# Patient Record
Sex: FEMALE | Race: WHITE | NOT HISPANIC OR LATINO | Employment: OTHER | URBAN - METROPOLITAN AREA
[De-identification: names, ages, dates, MRNs, and addresses within clinical notes are randomized per-mention and may not be internally consistent; named-entity substitution may affect disease eponyms.]

---

## 2018-01-08 ENCOUNTER — OFFICE VISIT CONVERTED (OUTPATIENT)
Dept: FAMILY MEDICINE CLINIC | Facility: CLINIC | Age: 48
End: 2018-01-08
Attending: NURSE PRACTITIONER

## 2018-05-02 ENCOUNTER — OFFICE VISIT CONVERTED (OUTPATIENT)
Dept: SURGERY | Facility: CLINIC | Age: 48
End: 2018-05-02
Attending: NURSE PRACTITIONER

## 2018-07-12 ENCOUNTER — CONVERSION ENCOUNTER (OUTPATIENT)
Dept: MAMMOGRAPHY | Facility: HOSPITAL | Age: 48
End: 2018-07-12

## 2018-07-25 ENCOUNTER — CONVERSION ENCOUNTER (OUTPATIENT)
Dept: MAMMOGRAPHY | Facility: HOSPITAL | Age: 48
End: 2018-07-25

## 2019-02-05 ENCOUNTER — HOSPITAL ENCOUNTER (OUTPATIENT)
Dept: MAMMOGRAPHY | Facility: HOSPITAL | Age: 49
Discharge: HOME | End: 2019-02-05
Attending: OBSTETRICS & GYNECOLOGY

## 2019-02-20 ENCOUNTER — HOSPITAL ENCOUNTER (OUTPATIENT)
Dept: FAMILY MEDICINE CLINIC | Facility: CLINIC | Age: 49
Discharge: HOME | End: 2019-02-20
Attending: NURSE PRACTITIONER

## 2019-02-20 ENCOUNTER — CONVERSION ENCOUNTER (OUTPATIENT)
Dept: FAMILY MEDICINE CLINIC | Facility: CLINIC | Age: 49
End: 2019-02-20

## 2019-02-20 ENCOUNTER — OFFICE VISIT CONVERTED (OUTPATIENT)
Dept: FAMILY MEDICINE CLINIC | Facility: CLINIC | Age: 49
End: 2019-02-20
Attending: NURSE PRACTITIONER

## 2019-02-20 LAB
ALBUMIN SERPL-MCNC: 4 G/DL (ref 3.5–5)
ALBUMIN/GLOB SERPL: 1.2 {RATIO} (ref 1.4–2.6)
ALP SERPL-CCNC: 70 U/L (ref 42–98)
ALT SERPL-CCNC: 27 U/L (ref 10–40)
ANION GAP SERPL CALC-SCNC: 14 MMOL/L (ref 8–19)
APPEARANCE UR: CLEAR
AST SERPL-CCNC: 27 U/L (ref 15–50)
BASOPHILS # BLD AUTO: 0.03 10*3/UL (ref 0–0.2)
BASOPHILS NFR BLD AUTO: 0.5 % (ref 0–3)
BILIRUB SERPL-MCNC: 0.39 MG/DL (ref 0.2–1.3)
BILIRUB UR QL: NEGATIVE
BUN SERPL-MCNC: 12 MG/DL (ref 5–25)
BUN/CREAT SERPL: 19 {RATIO} (ref 6–20)
CALCIUM SERPL-MCNC: 8.9 MG/DL (ref 8.7–10.4)
CHLORIDE SERPL-SCNC: 104 MMOL/L (ref 99–111)
CHOLEST SERPL-MCNC: 171 MG/DL (ref 107–200)
CHOLEST/HDLC SERPL: 2.7 {RATIO} (ref 3–6)
COLOR UR: YELLOW
CONV ABS IMM GRAN: 0.01 10*3/UL (ref 0–0.2)
CONV CO2: 26 MMOL/L (ref 22–32)
CONV COLLECTION SOURCE (UA): NORMAL
CONV IMMATURE GRAN: 0.2 % (ref 0–1.8)
CONV TOTAL PROTEIN: 7.3 G/DL (ref 6.3–8.2)
CONV UROBILINOGEN IN URINE BY AUTOMATED TEST STRIP: 0.2 {EHRLICHU}/DL (ref 0.1–1)
CREAT UR-MCNC: 0.62 MG/DL (ref 0.5–0.9)
DEPRECATED RDW RBC AUTO: 41.1 FL (ref 36.4–46.3)
EOSINOPHIL # BLD AUTO: 0.13 10*3/UL (ref 0–0.7)
EOSINOPHIL # BLD AUTO: 2.1 % (ref 0–7)
ERYTHROCYTE [DISTWIDTH] IN BLOOD BY AUTOMATED COUNT: 11.9 % (ref 11.7–14.4)
FSH SERPL-ACNC: 11.9 M[IU]/ML
GFR SERPLBLD BASED ON 1.73 SQ M-ARVRAT: >60 ML/MIN/{1.73_M2}
GLOBULIN UR ELPH-MCNC: 3.3 G/DL (ref 2–3.5)
GLUCOSE SERPL-MCNC: 79 MG/DL (ref 65–99)
GLUCOSE UR QL: NEGATIVE MG/DL
HBA1C MFR BLD: 13.6 G/DL (ref 12–16)
HCT VFR BLD AUTO: 41.7 % (ref 37–47)
HDLC SERPL-MCNC: 63 MG/DL (ref 40–60)
HGB UR QL STRIP: NEGATIVE
KETONES UR QL STRIP: NEGATIVE MG/DL
LDLC SERPL CALC-MCNC: 94 MG/DL (ref 70–100)
LEUKOCYTE ESTERASE UR QL STRIP: NEGATIVE
LH SERPL-ACNC: 15.1 M[IU]/ML
LYMPHOCYTES # BLD AUTO: 2.05 10*3/UL (ref 1–5)
MCH RBC QN AUTO: 30.6 PG (ref 27–31)
MCHC RBC AUTO-ENTMCNC: 32.6 G/DL (ref 33–37)
MCV RBC AUTO: 93.9 FL (ref 81–99)
MONOCYTES # BLD AUTO: 0.4 10*3/UL (ref 0.2–1.2)
MONOCYTES NFR BLD AUTO: 6.4 % (ref 3–10)
NEUTROPHILS # BLD AUTO: 3.61 10*3/UL (ref 2–8)
NEUTROPHILS NFR BLD AUTO: 57.9 % (ref 30–85)
NITRITE UR QL STRIP: NEGATIVE
NRBC CBCN: 0 % (ref 0–0.7)
OSMOLALITY SERPL CALC.SUM OF ELEC: 289 MOSM/KG (ref 273–304)
PH UR STRIP.AUTO: 7 [PH] (ref 5–8)
PLATELET # BLD AUTO: 212 10*3/UL (ref 130–400)
PMV BLD AUTO: 12.3 FL (ref 9.4–12.3)
POTASSIUM SERPL-SCNC: 4.3 MMOL/L (ref 3.5–5.3)
PROT UR QL: NEGATIVE MG/DL
RBC # BLD AUTO: 4.44 10*6/UL (ref 4.2–5.4)
SODIUM SERPL-SCNC: 140 MMOL/L (ref 135–147)
SP GR UR: 1.02 (ref 1–1.03)
TRIGL SERPL-MCNC: 72 MG/DL (ref 40–150)
VARIANT LYMPHS NFR BLD MANUAL: 32.9 % (ref 20–45)
VLDLC SERPL-MCNC: 14 MG/DL (ref 5–37)
WBC # BLD AUTO: 6.23 10*3/UL (ref 4.8–10.8)

## 2019-07-09 ENCOUNTER — OFFICE VISIT CONVERTED (OUTPATIENT)
Dept: SURGERY | Facility: CLINIC | Age: 49
End: 2019-07-09
Attending: NURSE PRACTITIONER

## 2019-07-29 ENCOUNTER — HOSPITAL ENCOUNTER (OUTPATIENT)
Dept: MAMMOGRAPHY | Facility: HOSPITAL | Age: 49
Discharge: HOME | End: 2019-07-29
Attending: OBSTETRICS & GYNECOLOGY

## 2019-10-25 ENCOUNTER — HOSPITAL ENCOUNTER (OUTPATIENT)
Dept: SURGERY | Facility: HOSPITAL | Age: 49
Setting detail: HOSPITAL OUTPATIENT SURGERY
Discharge: HOME | End: 2019-10-25
Attending: SURGERY

## 2020-03-30 ENCOUNTER — TELEMEDICINE CONVERTED (OUTPATIENT)
Dept: FAMILY MEDICINE CLINIC | Facility: CLINIC | Age: 50
End: 2020-03-30
Attending: NURSE PRACTITIONER

## 2020-05-28 ENCOUNTER — HOSPITAL ENCOUNTER (OUTPATIENT)
Dept: FAMILY MEDICINE CLINIC | Facility: CLINIC | Age: 50
Discharge: HOME | End: 2020-05-28
Attending: NURSE PRACTITIONER

## 2020-05-28 LAB
ALBUMIN SERPL-MCNC: 4.1 G/DL (ref 3.5–5)
ALBUMIN/GLOB SERPL: 1.3 {RATIO} (ref 1.4–2.6)
ALP SERPL-CCNC: 66 U/L (ref 53–141)
ALT SERPL-CCNC: 15 U/L (ref 10–40)
ANION GAP SERPL CALC-SCNC: 15 MMOL/L (ref 8–19)
AST SERPL-CCNC: 18 U/L (ref 15–50)
BASOPHILS # BLD AUTO: 0.05 10*3/UL (ref 0–0.2)
BASOPHILS NFR BLD AUTO: 0.7 % (ref 0–3)
BILIRUB SERPL-MCNC: 0.41 MG/DL (ref 0.2–1.3)
BUN SERPL-MCNC: 10 MG/DL (ref 5–25)
BUN/CREAT SERPL: 13 {RATIO} (ref 6–20)
CALCIUM SERPL-MCNC: 9.5 MG/DL (ref 8.7–10.4)
CHLORIDE SERPL-SCNC: 106 MMOL/L (ref 99–111)
CHOLEST SERPL-MCNC: 161 MG/DL (ref 107–200)
CHOLEST/HDLC SERPL: 2.9 {RATIO} (ref 3–6)
CONV ABS IMM GRAN: 0.03 10*3/UL (ref 0–0.2)
CONV CO2: 22 MMOL/L (ref 22–32)
CONV IMMATURE GRAN: 0.4 % (ref 0–1.8)
CONV TOTAL PROTEIN: 7.2 G/DL (ref 6.3–8.2)
CREAT UR-MCNC: 0.78 MG/DL (ref 0.5–0.9)
DEPRECATED RDW RBC AUTO: 41.6 FL (ref 36.4–46.3)
EOSINOPHIL # BLD AUTO: 0.19 10*3/UL (ref 0–0.7)
EOSINOPHIL # BLD AUTO: 2.8 % (ref 0–7)
ERYTHROCYTE [DISTWIDTH] IN BLOOD BY AUTOMATED COUNT: 12 % (ref 11.7–14.4)
GFR SERPLBLD BASED ON 1.73 SQ M-ARVRAT: >60 ML/MIN/{1.73_M2}
GLOBULIN UR ELPH-MCNC: 3.1 G/DL (ref 2–3.5)
GLUCOSE SERPL-MCNC: 90 MG/DL (ref 65–99)
HCT VFR BLD AUTO: 42.9 % (ref 37–47)
HDLC SERPL-MCNC: 55 MG/DL (ref 40–60)
HGB BLD-MCNC: 14.1 G/DL (ref 12–16)
LDLC SERPL CALC-MCNC: 94 MG/DL (ref 70–100)
LYMPHOCYTES # BLD AUTO: 2.5 10*3/UL (ref 1–5)
LYMPHOCYTES NFR BLD AUTO: 36.3 % (ref 20–45)
MCH RBC QN AUTO: 30.9 PG (ref 27–31)
MCHC RBC AUTO-ENTMCNC: 32.9 G/DL (ref 33–37)
MCV RBC AUTO: 94.1 FL (ref 81–99)
MONOCYTES # BLD AUTO: 0.43 10*3/UL (ref 0.2–1.2)
MONOCYTES NFR BLD AUTO: 6.2 % (ref 3–10)
NEUTROPHILS # BLD AUTO: 3.69 10*3/UL (ref 2–8)
NEUTROPHILS NFR BLD AUTO: 53.6 % (ref 30–85)
NRBC CBCN: 0 % (ref 0–0.7)
OSMOLALITY SERPL CALC.SUM OF ELEC: 287 MOSM/KG (ref 273–304)
PLATELET # BLD AUTO: 175 10*3/UL (ref 130–400)
PMV BLD AUTO: 12.5 FL (ref 9.4–12.3)
POTASSIUM SERPL-SCNC: 4.4 MMOL/L (ref 3.5–5.3)
RBC # BLD AUTO: 4.56 10*6/UL (ref 4.2–5.4)
SODIUM SERPL-SCNC: 139 MMOL/L (ref 135–147)
T4 FREE SERPL-MCNC: 0.9 NG/DL (ref 0.9–1.8)
TRIGL SERPL-MCNC: 62 MG/DL (ref 40–150)
TSH SERPL-ACNC: 3.3 M[IU]/L (ref 0.27–4.2)
VLDLC SERPL-MCNC: 12 MG/DL (ref 5–37)
WBC # BLD AUTO: 6.89 10*3/UL (ref 4.8–10.8)

## 2020-07-31 ENCOUNTER — HOSPITAL ENCOUNTER (OUTPATIENT)
Dept: MAMMOGRAPHY | Facility: HOSPITAL | Age: 50
Discharge: HOME | End: 2020-07-31
Attending: OBSTETRICS & GYNECOLOGY

## 2020-10-27 ENCOUNTER — TELEPHONE CONVERTED (OUTPATIENT)
Dept: SURGERY | Facility: CLINIC | Age: 50
End: 2020-10-27
Attending: NURSE PRACTITIONER

## 2020-12-15 ENCOUNTER — HOSPITAL ENCOUNTER (OUTPATIENT)
Dept: GASTROENTEROLOGY | Facility: HOSPITAL | Age: 50
Setting detail: HOSPITAL OUTPATIENT SURGERY
Discharge: HOME | End: 2020-12-15
Attending: SURGERY

## 2020-12-15 LAB — HCG UR QL: NEGATIVE

## 2021-01-04 ENCOUNTER — OFFICE VISIT CONVERTED (OUTPATIENT)
Dept: FAMILY MEDICINE CLINIC | Facility: CLINIC | Age: 51
End: 2021-01-04
Attending: NURSE PRACTITIONER

## 2021-01-07 ENCOUNTER — OFFICE VISIT CONVERTED (OUTPATIENT)
Dept: SURGERY | Facility: CLINIC | Age: 51
End: 2021-01-07
Attending: NURSE PRACTITIONER

## 2021-01-19 ENCOUNTER — HOSPITAL ENCOUNTER (OUTPATIENT)
Dept: LAB | Facility: HOSPITAL | Age: 51
Discharge: HOME | End: 2021-01-19
Attending: NURSE PRACTITIONER

## 2021-01-19 LAB
25(OH)D3 SERPL-MCNC: 36.6 NG/ML (ref 30–100)
ALBUMIN SERPL-MCNC: 4 G/DL (ref 3.5–5)
ALBUMIN/GLOB SERPL: 1.4 {RATIO} (ref 1.4–2.6)
ALP SERPL-CCNC: 75 U/L (ref 53–141)
ALT SERPL-CCNC: 16 U/L (ref 10–40)
ANION GAP SERPL CALC-SCNC: 15 MMOL/L (ref 8–19)
AST SERPL-CCNC: 21 U/L (ref 15–50)
BASOPHILS # BLD AUTO: 0.03 10*3/UL (ref 0–0.2)
BASOPHILS NFR BLD AUTO: 0.4 % (ref 0–3)
BILIRUB SERPL-MCNC: 0.5 MG/DL (ref 0.2–1.3)
BUN SERPL-MCNC: 8 MG/DL (ref 5–25)
BUN/CREAT SERPL: 11 {RATIO} (ref 6–20)
CALCIUM SERPL-MCNC: 8.9 MG/DL (ref 8.7–10.4)
CHLORIDE SERPL-SCNC: 104 MMOL/L (ref 99–111)
CHOLEST SERPL-MCNC: 175 MG/DL (ref 107–200)
CHOLEST/HDLC SERPL: 2.8 {RATIO} (ref 3–6)
CONV ABS IMM GRAN: 0.02 10*3/UL (ref 0–0.2)
CONV CO2: 24 MMOL/L (ref 22–32)
CONV IMMATURE GRAN: 0.3 % (ref 0–1.8)
CONV TOTAL PROTEIN: 6.8 G/DL (ref 6.3–8.2)
CREAT UR-MCNC: 0.7 MG/DL (ref 0.5–0.9)
DEPRECATED RDW RBC AUTO: 42 FL (ref 36.4–46.3)
EOSINOPHIL # BLD AUTO: 0.16 10*3/UL (ref 0–0.7)
EOSINOPHIL # BLD AUTO: 2.2 % (ref 0–7)
ERYTHROCYTE [DISTWIDTH] IN BLOOD BY AUTOMATED COUNT: 12.1 % (ref 11.7–14.4)
GFR SERPLBLD BASED ON 1.73 SQ M-ARVRAT: >60 ML/MIN/{1.73_M2}
GLOBULIN UR ELPH-MCNC: 2.8 G/DL (ref 2–3.5)
GLUCOSE SERPL-MCNC: 84 MG/DL (ref 65–99)
HCT VFR BLD AUTO: 41.5 % (ref 37–47)
HDLC SERPL-MCNC: 63 MG/DL (ref 40–60)
HGB BLD-MCNC: 13.7 G/DL (ref 12–16)
LDLC SERPL CALC-MCNC: 94 MG/DL (ref 70–100)
LYMPHOCYTES # BLD AUTO: 2.88 10*3/UL (ref 1–5)
LYMPHOCYTES NFR BLD AUTO: 39.9 % (ref 20–45)
MCH RBC QN AUTO: 30.8 PG (ref 27–31)
MCHC RBC AUTO-ENTMCNC: 33 G/DL (ref 33–37)
MCV RBC AUTO: 93.3 FL (ref 81–99)
MONOCYTES # BLD AUTO: 0.41 10*3/UL (ref 0.2–1.2)
MONOCYTES NFR BLD AUTO: 5.7 % (ref 3–10)
NEUTROPHILS # BLD AUTO: 3.72 10*3/UL (ref 2–8)
NEUTROPHILS NFR BLD AUTO: 51.5 % (ref 30–85)
NRBC CBCN: 0 % (ref 0–0.7)
OSMOLALITY SERPL CALC.SUM OF ELEC: 286 MOSM/KG (ref 273–304)
PLATELET # BLD AUTO: 199 10*3/UL (ref 130–400)
PMV BLD AUTO: 12.6 FL (ref 9.4–12.3)
POTASSIUM SERPL-SCNC: 3.8 MMOL/L (ref 3.5–5.3)
RBC # BLD AUTO: 4.45 10*6/UL (ref 4.2–5.4)
SODIUM SERPL-SCNC: 139 MMOL/L (ref 135–147)
TRIGL SERPL-MCNC: 88 MG/DL (ref 40–150)
TSH SERPL-ACNC: 3.23 M[IU]/L (ref 0.27–4.2)
VLDLC SERPL-MCNC: 18 MG/DL (ref 5–37)
WBC # BLD AUTO: 7.22 10*3/UL (ref 4.8–10.8)

## 2021-01-22 LAB
ASO AB SERPL-ACNC: 22 [IU]/ML (ref 0–200)
CONV RHEUMATOID FACTOR IGM: <10 [IU]/ML (ref 0–14)
CRP SERPL-MCNC: 1.3 MG/L (ref 0–5)
DSDNA AB SER-ACNC: NEGATIVE [IU]/ML
ENA AB SER IA-ACNC: NEGATIVE {RATIO}
ERYTHROCYTE [SEDIMENTATION RATE] IN BLOOD: 10 MM/H (ref 0–30)
PHOSPHATE SERPL-MCNC: 2.6 MG/DL (ref 2.4–4.5)
URATE SERPL-MCNC: 3.8 MG/DL (ref 2.5–7.5)

## 2021-02-01 ENCOUNTER — HOSPITAL ENCOUNTER (OUTPATIENT)
Dept: CARDIOLOGY | Facility: HOSPITAL | Age: 51
Discharge: HOME | End: 2021-02-01
Attending: NURSE PRACTITIONER

## 2021-03-19 ENCOUNTER — HOSPITAL ENCOUNTER (OUTPATIENT)
Dept: VACCINE CLINIC | Facility: HOSPITAL | Age: 51
Discharge: HOME | End: 2021-03-19
Attending: INTERNAL MEDICINE

## 2021-04-16 ENCOUNTER — HOSPITAL ENCOUNTER (OUTPATIENT)
Dept: VACCINE CLINIC | Facility: HOSPITAL | Age: 51
Discharge: HOME | End: 2021-04-16
Attending: INTERNAL MEDICINE

## 2021-05-10 NOTE — H&P
History and Physical      Patient Name: Katy Dominguez   Patient ID: [id number]   Sex: Female   YOB: 1970    Primary Care Provider: Birgit SANTANA    Visit Date: January 7, 2021    Provider: ESTHER Tamez   Location: [facility]   Location Address: [address] [place] [address]  [id number]   Location Phone: [phone]          Chief Complaint  · Follow Up Colonoscopy      History Of Present Illness  Katy Dominguez is a 50 year old /White female who is here to follow up colonoscopy.      Patient presents today for follow-up visit after undergoing a colonoscopy on 12/15/2020 performed by Dr. Duncan Gaines.  Patient was with internal hemorrhoids per colonoscopy.  Patient denies any postoperative complications.       Past Medical History  Disease Name Date Onset Notes   Colon polyps --  --    Hypertension --  --          Past Surgical History  Procedure Name Date Notes   Colonoscopy 10/25/2019 Polyps (8 mm) in the distal transverse colon, Polyps (5 mm) in the cecum.   EGD 10/25/2019 Normal esophagus, Diffuse continuous erythema of the mucosa was noted in the antrum, Multiple cold forceps biopsies were performed for histology, Normal duodenum.   Endometrial ablation --  --    Gallbladder --  --          Medication List  Name Date Started Instructions   Aspir-81 81 mg oral tablet,delayed release (/EC)  take 1 tablet (81 mg) by oral route once daily   lisinopril 5 mg oral tablet 01/04/2021 take 1 tablet (5 mg) by oral route once daily for 30 days         Allergy List  Allergen Name Date Reaction Notes   PENICILLINS --  --  --        Allergies Reconciled  Family Medical History  Disease Name Relative/Age Notes   Breast Neoplasm, Malignant Grandmother (maternal)/   great gm   Heart Disease Aunt/  Father/  Grandfather (paternal)/  Mother/  Uncle/   --    Hypertension Father/  Grandfather (paternal)/  Mother/   --    Cancer, Unspecified Father/  Uncle/    "myelodysplasia blood, a second \"slow progression type\"   Rheumatoid arthritis Mother/   --    Diabetes Mellitus, Type II Grandmother (maternal)/  Uncle/   type 2         Social History  Finding Status Start/Stop Quantity Notes   Alcohol Never --/-- --  --    Tobacco Never --/-- --  --          Review of Systems  · Constitutional  o Denies  o : chills, fever  · Eyes  o Denies  o : yellowish discoloration of eyes  · HENT  o Denies  o : difficulty swallowing  · Cardiovascular  o Denies  o : chest pain on exertion  · Respiratory  o Denies  o : shortness of breath  · Gastrointestinal  o Denies  o : nausea, vomiting, diarrhea, constipation  · Genitourinary  o Denies  o : abnormal color of urine  · Integument  o Denies  o : rash  · Neurologic  o Denies  o : tingling or numbness  · Musculoskeletal  o Denies  o : joint pain  · Endocrine  o Denies  o : weight gain, weight loss      Vitals  Date Time BP Position Site L\R Cuff Size HR RR TEMP (F) WT  HT  BMI kg/m2 BSA m2 O2 Sat FR L/min FiO2 HC       01/07/2021 10:49 AM       12  182lbs 0oz 5'  2\" 33.29 1.9             Physical Examination  · Constitutional  o Appearance  o : well developed, well-nourished, patient in no apparent distress  · Head and Face  o Head  o :   § Inspection  § : atraumatic, normocephalic  o Face  o :   § Inspection  § : no facial lesions  · Eyes  o Conjunctivae  o : conjunctivae normal  o Sclerae  o : sclerae white  · Neck  o Inspection/Palpation  o : normal appearance, no masses or tenderness, trachea midline  · Respiratory  o Respiratory Effort  o : breathing unlabored  · Skin and Subcutaneous Tissue  o General Inspection  o : no lesions present, no areas of discoloration, skin turgor normal, texture normal  · Neurologic  o Mental Status Examination  o :   § Orientation  § : grossly oriented to person, place and time  § Attention  § : attention normal, concentration abilities normal  § Fund of Knowledge  § : fund of knowledge within normal limits, " patient aware of current events  o Gait and Station  o : normal gait, able to stand without difficulty  · Psychiatric  o Judgement and Insight  o : judgment and insight intact  o Mood and Affect  o : mood normal, affect appropriate          Assessment  · Internal hemorrhoids     455.0/K64.8  · S/P colonoscopy     V45.89/Z98.890    Problems Reconciled  Plan  · Medications  o Medications have been Reconciled  o Transition of Care or Provider Policy  · Instructions  o Per the AGA guidelines of 2012 patient is to follow up for colonoscopy surveillance  o Rescreen: In 1 year. Follow-up in the interim as needed  o Electronically Identified Patient Education Materials Provided Electronically  · Disposition  o Call or Return if symptoms worsen or persist.            Electronically Signed by: ESTHER Tamez -Author on January 7, 2021 01:12:46 PM

## 2021-05-10 NOTE — H&P
History and Physical      Patient Name: Katy Dominguez   Patient ID: [id number]   Sex: Female   YOB: 1970    Primary Care Provider: Birgit SANTANA    Visit Date: October 27, 2020    Provider: ESTHER Tamez   Location: [facility]   Location Address: [address] [place] [address]  [id number]   Location Phone: [phone]          Chief Complaint  · Requesting colonoscopy  · Age 50 or over  · Here today for a pre-surgical colon screening visit  · Personal History of Polyps      History Of Present Illness  The patient is a 50 year old /White female presenting to the Surgical Specialist office on a referral from Vasquez Orlando MD.   Katy Dominguez needs to have a screening colonoscopy.   Patient states that they have had a colonoscopy. 1 year ago   Patient currently complains of: no complaints   Patient Does have family history of colon cancer. Father      Patient presents today on referral from Dr. Vasquez Orlando.  Patient presents today without complaints for screening colonoscopy.  Denies any abdominal pain, diarrhea, or rectal bleeding.  Admits to history of colonic polyps.  Admits to a family history of colon cancer with her father.    10/19: Colonoscopy ([facility]): Transverse -tubular & Tubulovillous adenomas; Cecum - Tubulovillous adenoma & serrated .     7/19: Colonoscopy ([facility]): Sigmoid - tubular adenoma; Cecum - tubular adenoma; cecum - Tubulovillous adenoma; Ascending - tubular adenoma; Hepatic flexure - 2 Tubulovillous adenoma; Sigmoid - tubular adenoma.       Past Medical History  Disease Name Date Onset Notes   Colon polyps --  --    Hypertension --  --          Past Surgical History  Procedure Name Date Notes   Colonoscopy 10/25/2019 Polyps (8 mm) in the distal transverse colon, Polyps (5 mm) in the cecum.   EGD 10/25/2019 Normal esophagus, Diffuse continuous erythema of the mucosa was noted in the antrum, Multiple cold forceps biopsies were performed for  "histology, Normal duodenum.   Endometrial ablation --  --    Gallbladder --  --          Medication List  Name Date Started Instructions   Aspir-81 81 mg oral tablet,delayed release (DR/EC)  take 1 tablet (81 mg) by oral route once daily   lisinopril 5 mg oral tablet 08/11/2020 take 1 tablet (5 mg) by oral route once daily for 90 days   Valtrex 1 gram oral tablet 03/30/2020 take 1 tablet (1,000 mg) by oral route 3 times per day for 7 days         Allergy List  Allergen Name Date Reaction Notes   PENICILLINS --  --  --        Allergies Reconciled  Family Medical History  Disease Name Relative/Age Notes   Breast Neoplasm, Malignant Grandmother (maternal)/   great gm   Heart Disease Aunt/  Father/  Grandfather (paternal)/  Mother/  Uncle/   --    Hypertension Father/  Grandfather (paternal)/  Mother/   --    Cancer, Unspecified Father/  Uncle/   myelodysplasia blood, a second \"slow progression type\"   Rheumatoid arthritis Mother/   --    Diabetes Mellitus, Type II Grandmother (maternal)/  Uncle/   type 2         Social History  Finding Status Start/Stop Quantity Notes   Alcohol Never --/-- --  --    Tobacco Never --/-- --  --          Review of Systems  · Constitutional  o Denies  o : fever, chills  · Eyes  o Denies  o : yellowish discoloration of eyes  · HENT  o Denies  o : difficulty swallowing  · Cardiovascular  o Denies  o : chest pain, chest pain on exertion  · Respiratory  o Denies  o : shortness of breath  · Gastrointestinal  o Denies  o : nausea, vomiting, diarrhea, constipation  · Genitourinary  o Denies  o : abnormal color of urine  · Integument  o Denies  o : rash  · Neurologic  o Denies  o : tingling or numbness  · Musculoskeletal  o Denies  o : joint pain  · Endocrine  o Denies  o : weight gain, weight loss      Vitals  Date Time BP Position Site L\R Cuff Size HR RR TEMP (F) WT  HT  BMI kg/m2 BSA m2 O2 Sat FR L/min FiO2 HC       10/27/2020 10:12 AM         174lbs 16oz 5'  2.5\" 31.5 1.87     "         Physical Examination  · Constitutional  o Appearance  o : well developed, well-nourished, patient in no apparent distress  · Head and Face  o Head  o :   § Inspection  § : atraumatic, normocephalic  o Face  o :   § Inspection  § : no facial lesions  · Eyes  o Conjunctivae  o : conjunctivae normal  o Sclerae  o : sclerae white  · Neck  o Inspection/Palpation  o : normal appearance, no masses or tenderness, trachea midline  · Respiratory  o Respiratory Effort  o : breathing unlabored  · Skin and Subcutaneous Tissue  o General Inspection  o : no lesions present, no areas of discoloration, skin turgor normal, texture normal  · Neurologic  o Mental Status Examination  o :   § Orientation  § : grossly oriented to person, place and time  § Attention  § : attention normal, concentration abilities normal  § Fund of Knowledge  § : fund of knowledge within normal limits, patient aware of current events  o Gait and Station  o : normal gait, able to stand without difficulty  · Psychiatric  o Judgement and Insight  o : judgment and insight intact  o Mood and Affect  o : mood normal, affect appropriate              Assessment  · Personal history of colonic polyps     V12.72/Z86.010  · Screening for colon cancer     V76.51/Z12.11  · Pre-op testing     V72.84/Z01.818    Problems Reconciled  Plan  · Orders  o Consent for Colonoscopy Screening-Possible risk/complications, benefits, and alternatives to surgical or invasive procedure have been explained to patient and/or legal guardian. -Patient has been evaluated and can tolerate anesthesia and/or sedation. Risks, benefits, and alternatives to anesthesia and sedation have been explained to patient and/or legal guardian. () - V76.51/Z12.11, V12.72/Z86.010, V72.84/Z01.818 - 12/15/2020  o [facility] Pre-Op Covid-19 Screening (46619) - V72.84/Z01.818 - 12/10/2020   [address] @ 8:30am  · Medications  o Medications have been Reconciled  o Transition of Care or Provider  Policy  · Instructions  o Surgical Facility: [facility]  o Handouts Provided Pre-Procedure Instructions including date, time, and location of procedure.   o PLAN: Proceeed with colonoscopy. Patient understands risks/benefits and is willing to proceed.   o ***Surgical Orders***  o RISK AND BENEFITS:  o Given these options, the patient has verbally expressed an understanding of the risks of the surgery and finds these risks acceptable. Will proceed with surgery as soon as possible.  o O.R. PREP: Per protocol   o IV: Per Anesthesia  o Please sign permit for: Colonoscopy with possible biopsies by Dr. Gaines.   o The above History and Physical Examination has been completed within 30 days of admission.  o ***Patient Status***  o Outpatient  o Follow up in the in the office post procedure.  o Advised patient she would need COVID-19 testing prior to procedure. Encourage patient self isolate between testing and procedure. Patient verbalizes understands well to proceed.  o Electronically Identified Patient Education Materials Provided Electronically  · Disposition  o Call or Return if symptoms worsen or persist.            Electronically Signed by: ESTHER Tamez -Author on October 27, 2020 10:45:35 AM

## 2021-05-12 NOTE — PROGRESS NOTES
"   Quick Note      Patient Name: Katy Dominguez   Patient ID: [id number]   Sex: Female   YOB: 1970    Primary Care Provider: Birgit SANTANA    Visit Date: March 30, 2020    Provider: ESTHER Carrero   Location: [facility]   Location Address: [address] [place] [address] [place]  [id number]   Location Phone: [phone]          History Of Present Illness  TELEHEALTH VISIT  Chief Complaint: Est care self isolating cough and sore throat no fever   Katy Dominguez is a 49 year old /White female who is presenting for evaluation via telehealth visit. Verbal consent obtained before beginning visit.   Provider spent 9:06-9:50 minutes with the patient during telehealth visit.   The following staff were present during this visit: CMartin lpn, pt and self (KCW)   Past Medical History/Overview of Patient Symptoms  Katy Dominguez is a 49 year old /White female who presents for evaluation and treatment of:      Pt is doing a telemed to est care at [facility].  She is switching practices for 1 reason.  She has had a cough, SOA at times with moving.  She started that a few weeks ago.  She said it is \"discomfort in the chest\" at times but NO CP noted.  She has not had any dizziness.  She will talk and the dry air will cause her ot cough.  She has been taking OTC cough/congestion for High BP and it is not helping.    Not coughing anything up.  Sore throat on and off.  No fever noted.  She goes to Dr Britt for GYN and Mammo.  She has a hx of shingles and herpes since 1 years old.  She has had BP issues since 2018.    She checks her BP at home and it is running 137/71.  She can tell when she is stressed and will get headache.    She had a colonoscopy in 2019.  She is not having any cardio issues with CP/SOA (just the cough pressure).             Assessment  · Cough     786.2/R05  · Essential hypertension     401.9/I10  · History of shingles     V12.09/Z86.19    Problems " Reconciled  Plan  · Orders  o Physician Telephone Evaluation, 21-30 minutes (54755) - - 03/30/2020  o ACO-39: Current medications updated and reviewed () - - 03/30/2020  o ACO-14: Influenza immunization administered or previously received () - - 03/30/2020  o ACO-20: Screening Mammography documented and reviewed () - - 03/30/2020  o ACO-19: Colorectal cancer screening results documented and reviewed (3017F) - - 03/30/2020  o ACO-13: Fall Risk Screening with no falls in past year or only one fall without injury in the past year (1101F) - - 03/30/2020  · Medications  o Valtrex 1 gram oral tablet   SIG: take 1 tablet (1,000 mg) by oral route 3 times per day for 7 days   DISP: (21) tablets with 5 refills  Prescribed on 03/30/2020     o lisinopril 5 mg oral tablet   SIG: take 1 tablet (5 mg) by oral route once daily for 90 days   DISP: (90) tablet with 0 refills  Refilled on 03/30/2020     o Medications have been Reconciled  o Transition of Care or Provider Policy  · Instructions  o Plan Of Care:   o Take all medications as prescribed/directed.  o Call the office with any concerns or questions.  o She will trial OTC zrytec and plain Mucinex. If she is not feeling better in 5-7 days she will let me know. We will do labs and a f.u visit in 2 months. Go head and start a 81mg EC ASA.   o Electronically Identified Patient Education Materials Provided Electronically  · Disposition  o Call or Return if symptoms worsen or persist.            Electronically Signed by: ESTHER Carrero -Author on March 30, 2020 12:26:15 PM

## 2021-05-14 VITALS — WEIGHT: 182 LBS | RESPIRATION RATE: 12 BRPM | HEIGHT: 62 IN | BODY MASS INDEX: 33.49 KG/M2

## 2021-05-14 VITALS
DIASTOLIC BLOOD PRESSURE: 79 MMHG | WEIGHT: 181 LBS | TEMPERATURE: 97.1 F | BODY MASS INDEX: 33.31 KG/M2 | HEART RATE: 70 BPM | HEIGHT: 62 IN | OXYGEN SATURATION: 96 % | SYSTOLIC BLOOD PRESSURE: 121 MMHG | RESPIRATION RATE: 12 BRPM

## 2021-05-14 VITALS — BODY MASS INDEX: 32.2 KG/M2 | HEIGHT: 62 IN | WEIGHT: 175 LBS

## 2021-05-14 NOTE — PROGRESS NOTES
"   Progress Note      Patient Name: Katy Dominguez   Patient ID: [id number]   Sex: Female   YOB: 1970    Primary Care Provider: Birgit SANTANA    Visit Date: January 4, 2021    Provider: ESTHER Carrero   Location: [facility]   Location Address: [address] [facility] [place]  [id number]   Location Phone: [phone]          Chief Complaint     Follow up med refills       History Of Present Illness     She had a colonoscopy in 2019.  She is not having any cardio issues with CP/SOA (just the cough pressure).  She does have pressure about 1 month ago.  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  She has been baking and trying to eat better.  She is exercising with walking just the last week.  She is taking her BP meds.   She does get increased heart rate at times just walking from 1 point to the other.  She feels that she skip a beat at times...not as often as before she retired.  She knows that she needs to take the BP med--she will get headache, and she was checking it at home and it was 130/80's.     She had an ablation and is not in menopausal.      She has some \"joint pain\" in the hands.       Past Medical History  Disease Name Date Onset Notes   Colon polyps --  --    Hypertension --  --          Past Surgical History  Procedure Name Date Notes   Colonoscopy 10/25/2019 Polyps (8 mm) in the distal transverse colon, Polyps (5 mm) in the cecum.   EGD 10/25/2019 Normal esophagus, Diffuse continuous erythema of the mucosa was noted in the antrum, Multiple cold forceps biopsies were performed for histology, Normal duodenum.   Endometrial ablation --  --    Gallbladder --  --          Medication List  Name Date Started Instructions   Aspir-81 81 mg oral tablet,delayed release (DR/EC)  take 1 tablet (81 mg) by oral route once daily   lisinopril 5 mg oral tablet 01/04/2021 take 1 tablet (5 mg) by oral route once daily for 30 days   Valtrex 1 gram oral tablet 03/30/2020 take " "1 tablet (1,000 mg) by oral route 3 times per day for 7 days         Allergy List  Allergen Name Date Reaction Notes   PENICILLINS --  --  --        Allergies Reconciled  Family Medical History  Disease Name Relative/Age Notes   Breast Neoplasm, Malignant Grandmother (maternal)/   great gm   Heart Disease Aunt/  Father/  Grandfather (paternal)/  Mother/  Uncle/   --    Hypertension Father/  Grandfather (paternal)/  Mother/   --    Cancer, Unspecified Father/  Uncle/   myelodysplasia blood, a second \"slow progression type\"   Rheumatoid arthritis Mother/   --    Diabetes Mellitus, Type II Grandmother (maternal)/  Uncle/   type 2         Social History  Finding Status Start/Stop Quantity Notes   Alcohol Never --/-- --  --    Tobacco Never --/-- --  --          Immunizations  NameDate Admin Mfg Trade Name Lot Number Route Inj VIS Given VIS Publication   Aobppdwmv60/01/2020 SKB Fluarix, quadrivalent, preservative free  NE NE 01/04/2021    Comments:          Review of Systems  · Constitutional  o Admits  o : weight gain  o Denies  o : fever, body aches  · Cardiovascular  o Admits  o : rapid heart rate  o Denies  o : chest pain, syncope  · Respiratory  o Denies  o : shortness of breath, wheezing, cough      Vitals  Date Time BP Position Site L\R Cuff Size HR RR TEMP (F) WT  HT  BMI kg/m2 BSA m2 O2 Sat FR L/min FiO2 HC       01/04/2021 08:19 /79 Sitting    70 - R 12 97.1 181lbs 0oz 5'  2.5\" 32.58 1.9 96 %            Physical Examination  · Constitutional  o Appearance  o : well-nourished, well developed, alert, in no acute distress  · Ears, Nose, Mouth and Throat  o Ears  o :   § External Ears  § : appearance within normal limits, no lesions present  § Otoscopic Examination  § : fluid TM's noted vania no redness  o Nose  o :   § External Nose  § : normal stucture noted.  § Intranasal Exam  § : no swelling, reddness, turbs normal vania.  o Oral Cavity  o :   § Oral Mucosa  § : oral mucosa normal without pallor or " "cyanosis  § Lips  § : lip appearance normal  § Teeth  § : normal dentition for age  § Gums  § : gums pink, non-swollen, no bleeding present  § Tongue  § : tongue appearance normal  § Palate  § : hard palate normal, soft palate appearance normal  o Throat  o :   § Oropharynx  § : no inflammation or lesions present, tonsils within normal limits  · Neck  o Inspection/Palpation  o : normal appearance, no masses or tenderness, trachea midline  · Respiratory  o Respiratory Effort  o : breathing unlabored  o Auscultation of Lungs  o : normal breath sounds throughout  · Cardiovascular  o Heart  o :   § Auscultation of Heart  § : regular rate and rhythm, no murmurs, gallops or rubs  § Palpation of Heart  § : normal apical impulse, no cardiac thrill present  o Peripheral Vascular System  o :   § Carotid Arteries  § : normal pulses bilaterally, no bruits present  § Pedal Pulses  § : pulses 2 bilaterally  § Extremities  § : no cyanosis, clubbing or edema; less than 2 second refill noted  · Neurologic  o Mental Status Examination  o :   § Orientation  § : grossly oriented to person, place and time  o Cranial Nerves  o : cranial nerves intact and symmetric throughout  · Psychiatric  o Mood and Affect  o : mood normal, affect appropriate, denies any SI/HI     There are \"knots\" on the right hand at the DIP joint           Assessment  · Essential hypertension     401.9/I10  · Screening for depression     V79.0/Z13.89  · Palpitation     785.1/R00.2  · Eustachian tube dysfunction     381.81/H69.80  · Joint pain     719.40/M25.50      Plan  · Orders  o ACO-18: Negative screen for clinical depression using a standardized tool () - V79.0/Z13.89 - 01/04/2021  o Physical, Primary Care Panel (CBC, CMP, Lipid, TSH) [facility] (78167, 36812, 76744, 41874) - - 01/04/2021  o Vitamin D (25-Hydroxy) Level (87731) - - 01/04/2021  o ACO-39: Current medications updated and reviewed () - - 01/04/2021  o ACO-14: Influenza immunization administered " or previously received () - - 01/04/2021  o ACO-20: Screening Mammography documented and reviewed () - - 01/04/2021  o ACO-19: Colorectal cancer screening results documented and reviewed (3017F) - - 01/04/2021  o ACO-13: Fall Risk Screening with no falls in past year or only one fall without injury in the past year (1101F) - - 01/04/2021  o Holter Monitor 48 Hour [facility] ([address]1455) - - 01/04/2021  o RA Profile 2 (Ca, Phos, Alk Phos, CRP, ESR, Uric Acid, ASO, RF IgM, HELEN) [facility] (33455, 23443, 23737, 00837, 01233, 53915, 63874, 93317, 96675) - - 01/04/2021  · Medications  o lisinopril 5 mg oral tablet   SIG: take 1 tablet (5 mg) by oral route once daily for 30 days   DISP: (30) Tablet with 5 refills  Refilled on 01/04/2021     o Medications have been Reconciled  o Transition of Care or Provider Policy  · Instructions  o Depression Screen completed and scanned into the EMR under the designated folder within the patient's documents.  o Today's PHQ-9 result is __2_  o Plan Of Care:   o Take all medications as prescribed/directed.  o Call the office with any concerns or questions.  o We will do holter monitor to check the heart. We will do fasting labs in the near future. If needed we will add beta blocker if heart rate is staying over 100's and several PVC/PAC. She will do Flonase and Claritin/zrytec for the ears. Keep me posted.  · Disposition  o Call or Return if symptoms worsen or persist.            Electronically Signed by: ESTHER Carrero -Author on January 4, 2021 09:09:51 AM

## 2021-05-15 VITALS — RESPIRATION RATE: 16 BRPM | BODY MASS INDEX: 34.11 KG/M2 | WEIGHT: 185.37 LBS | HEIGHT: 62 IN

## 2021-05-16 VITALS
TEMPERATURE: 98.1 F | RESPIRATION RATE: 32 BRPM | WEIGHT: 183 LBS | HEIGHT: 62 IN | HEART RATE: 75 BPM | BODY MASS INDEX: 33.68 KG/M2 | SYSTOLIC BLOOD PRESSURE: 128 MMHG | DIASTOLIC BLOOD PRESSURE: 80 MMHG | OXYGEN SATURATION: 97 %

## 2021-05-16 VITALS
HEIGHT: 62 IN | BODY MASS INDEX: 31.83 KG/M2 | DIASTOLIC BLOOD PRESSURE: 93 MMHG | HEART RATE: 67 BPM | OXYGEN SATURATION: 99 % | SYSTOLIC BLOOD PRESSURE: 156 MMHG | RESPIRATION RATE: 16 BRPM | WEIGHT: 173 LBS

## 2021-05-16 VITALS — WEIGHT: 170 LBS | BODY MASS INDEX: 39.34 KG/M2 | HEIGHT: 55 IN | RESPIRATION RATE: 16 BRPM

## 2021-07-01 ENCOUNTER — OFFICE VISIT (OUTPATIENT)
Dept: FAMILY MEDICINE CLINIC | Facility: CLINIC | Age: 51
End: 2021-07-01

## 2021-07-01 VITALS
RESPIRATION RATE: 16 BRPM | SYSTOLIC BLOOD PRESSURE: 135 MMHG | HEART RATE: 70 BPM | BODY MASS INDEX: 33.33 KG/M2 | TEMPERATURE: 98 F | WEIGHT: 181.1 LBS | OXYGEN SATURATION: 97 % | DIASTOLIC BLOOD PRESSURE: 73 MMHG | HEIGHT: 62 IN

## 2021-07-01 DIAGNOSIS — I10 ESSENTIAL HYPERTENSION: Primary | ICD-10-CM

## 2021-07-01 DIAGNOSIS — Z51.81 ENCOUNTER FOR MEDICATION MONITORING: ICD-10-CM
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
## 2021-07-01 PROBLEM — K63.5 COLON POLYPS: Status: ACTIVE | Noted: 2021-07-01

## 2021-07-01 LAB
ALBUMIN SERPL-MCNC: 4.2 G/DL (ref 3.5–5.2)
ALBUMIN/GLOB SERPL: 1.5 G/DL
ALP SERPL-CCNC: 76 U/L (ref 39–117)
ALT SERPL W P-5'-P-CCNC: 15 U/L (ref 1–33)
ANION GAP SERPL CALCULATED.3IONS-SCNC: 7.6 MMOL/L (ref 5–15)
AST SERPL-CCNC: 18 U/L (ref 1–32)
BILIRUB SERPL-MCNC: 0.2 MG/DL (ref 0–1.2)
BUN SERPL-MCNC: 9 MG/DL (ref 6–20)
BUN/CREAT SERPL: 15 (ref 7–25)
CALCIUM SPEC-SCNC: 8.8 MG/DL (ref 8.6–10.5)
CHLORIDE SERPL-SCNC: 103 MMOL/L (ref 98–107)
CO2 SERPL-SCNC: 27.4 MMOL/L (ref 22–29)
CREAT SERPL-MCNC: 0.6 MG/DL (ref 0.57–1)
GFR SERPL CREATININE-BSD FRML MDRD: 105 ML/MIN/1.73
GLOBULIN UR ELPH-MCNC: 2.8 GM/DL
GLUCOSE SERPL-MCNC: 83 MG/DL (ref 65–99)
POTASSIUM SERPL-SCNC: 4 MMOL/L (ref 3.5–5.2)
PROT SERPL-MCNC: 7 G/DL (ref 6–8.5)
SODIUM SERPL-SCNC: 138 MMOL/L (ref 136–145)

## 2021-07-01 PROCEDURE — 99213 OFFICE O/P EST LOW 20 MIN: CPT | Performed by: NURSE PRACTITIONER

## 2021-07-01 PROCEDURE — 80053 COMPREHEN METABOLIC PANEL: CPT | Performed by: NURSE PRACTITIONER

## 2021-07-01 RX ORDER — ASPIRIN 81 MG/1
TABLET ORAL
COMMUNITY

## 2021-07-01 RX ORDER — DOXYCYCLINE 100 MG/1
CAPSULE ORAL
COMMUNITY
Start: 2021-06-25 | End: 2021-12-10

## 2021-07-01 RX ORDER — LISINOPRIL 5 MG/1
TABLET ORAL
COMMUNITY
Start: 2021-04-21 | End: 2021-07-01 | Stop reason: SDUPTHER

## 2021-07-01 RX ORDER — LISINOPRIL 5 MG/1
5 TABLET ORAL DAILY
Qty: 90 TABLET | Refills: 1 | Status: SHIPPED | OUTPATIENT
Start: 2021-07-01 | End: 2022-02-14

## 2021-07-01 NOTE — ASSESSMENT & PLAN NOTE
She is here for refill of lisinopril.  She is doing well on the medication.  No cough shortness of breath or chest pain.  She had her cholesterol checked 6 months ago and it was perfect we will just check a comp today.  Follow-up in 6 months

## 2021-07-01 NOTE — PROGRESS NOTES
Chief Complaint  Hypertension and Follow-up    Subjective          Katy Dominguez presents to [facility]  History of Present Illness    She is going to need a colonoscopy in dec 2021  Derm placed on doxycycline for a tick bite.  It looked like a male deer tick.  It is on the thigh just below the panty line.      Past Medical History:   • Colon polyp   • HTN (hypertension)       Allergies  Penicillins    Past Surgical History:   • COLONOSCOPY    Polyps 8 mm in the distal transverse colon, Polyp 5mm in the cecum   • ENDOMETRIAL ABLATION   • ENDOSCOPY    Normal esophagus, Diffuse continuous erythema of the mucosa was noted in the antrum, Multiple cold forceps biopsies were performed for histology, Normal duodenum   • GALLBLADDER SURGERY       Social History     Tobacco Use   • Smoking status: Never Smoker   • Smokeless tobacco: Never Used   Substance Use Topics   • Alcohol use: Never   • Drug use: Not on file       Family History   Problem Relation Age of Onset   • Heart disease Mother    • Hypertension Mother    • Rheum arthritis Mother    • Heart disease Father    • Hypertension Father    • Cancer Father         Myelodysplasia type 2   • Brain cancer Maternal Grandmother    • Diabetes type II Maternal Grandmother    • Heart disease Paternal Grandfather    • Hypertension Paternal Grandfather    • Heart disease Other    • Heart disease Other    • Cancer Other         Unspecified   • Diabetes type II Other         Health Maintenance Due   Topic Date Due   • ANNUAL PHYSICAL  Never done   • ZOSTER VACCINE (1 of 2) Never done   • HEPATITIS C SCREENING  Never done   • PAP SMEAR  Never done          Current Outpatient Medications:   •  aspirin (aspirin) 81 MG EC tablet, Aspir-81 81 mg oral tablet,delayed release (DR/EC) take 1 tablet (81 mg) by oral route once daily   Active, Disp: , Rfl:   •  doxycycline (MONODOX) 100 MG capsule, , Disp: , Rfl:   •  lisinopril (PRINIVIL,ZESTRIL) 5 MG  tablet, Take 1 tablet by mouth Daily., Disp: 90 tablet, Rfl: 1    Medications Discontinued During This Encounter   Medication Reason   • lisinopril (PRINIVIL,ZESTRIL) 5 MG tablet Reorder       Immunization History   Administered Date(s) Administered   • Influenza, Unspecified 10/01/2020       Review of Systems   Respiratory: Negative for cough and shortness of breath.    Cardiovascular: Negative for chest pain and leg swelling.        Objective       Vitals:    07/01/21 1519   BP: 135/73   Pulse: 70   Resp: 16   Temp: 98 °F (36.7 °C)   SpO2: 97%     Body mass index is 32.86 kg/m².         Physical Exam  Vitals reviewed.   Constitutional:       Appearance: Normal appearance. She is well-developed.   HENT:      Mouth/Throat:      Pharynx: No oropharyngeal exudate.   Cardiovascular:      Rate and Rhythm: Normal rate and regular rhythm.      Heart sounds: Normal heart sounds. No murmur heard.     Pulmonary:      Effort: Pulmonary effort is normal.      Breath sounds: Normal breath sounds.   Musculoskeletal:      Right lower leg: No edema.      Left lower leg: No edema.   Neurological:      Mental Status: She is alert and oriented to person, place, and time.      Cranial Nerves: No cranial nerve deficit.      Motor: No weakness.   Psychiatric:         Mood and Affect: Mood and affect normal.             Result Review :     The following data was reviewed by: ESTHER Carrero on 07/01/2021:    Common labs    Common Labsle 1/19/21 1/19/21    0932 0932   Glucose 84    BUN 8    Creatinine 0.70    Sodium 139    Potassium 3.8    Chloride 104    Calcium 8.9    Albumin 4.0    Total Bilirubin 0.50    Alkaline Phosphatase 75    AST (SGOT) 21    ALT (SGPT) 16    WBC 7.22    Hemoglobin 13.7    Hematocrit 41.5    Platelets 199    Total Cholesterol 175    Triglycerides 88    HDL Cholesterol 63 (A)    LDL Cholesterol  94    Uric Acid  3.8   (A) Abnormal value       Comments are available for some flowsheets but are not being  displayed.                          Assessment and Plan      Diagnoses and all orders for this visit:    1. Essential hypertension (Primary)  Assessment & Plan:  She is here for refill of lisinopril.  She is doing well on the medication.  No cough shortness of breath or chest pain.  She had her cholesterol checked 6 months ago and it was perfect we will just check a comp today.  Follow-up in 6 months    Orders:  -     lisinopril (PRINIVIL,ZESTRIL) 5 MG tablet; Take 1 tablet by mouth Daily.  Dispense: 90 tablet; Refill: 1  -     Comprehensive Metabolic Panel    2. Tick bite, subsequent encounter    3. Encounter for medication monitoring          Follow Up     Return in about 6 months (around 1/1/2022).    Patient was given instructions and counseling regarding her condition or for health maintenance advice. Please see specific information pulled into the AVS if appropriate.   She will keep me posted on the tick bite.  If she starts to have any type of reaction with beef Lamb or pork she will let me know.  We will follow up in 6 months for routine labs and appointment.

## 2021-07-16 ENCOUNTER — TRANSCRIBE ORDERS (OUTPATIENT)
Dept: ADMINISTRATIVE | Facility: HOSPITAL | Age: 51
End: 2021-07-16

## 2021-07-16 DIAGNOSIS — Z92.89 HISTORY OF MAMMOGRAPHY, SCREENING: Primary | ICD-10-CM

## 2021-08-25 ENCOUNTER — APPOINTMENT (OUTPATIENT)
Dept: MAMMOGRAPHY | Facility: HOSPITAL | Age: 51
End: 2021-08-25

## 2021-10-26 ENCOUNTER — HOSPITAL ENCOUNTER (OUTPATIENT)
Dept: MAMMOGRAPHY | Facility: HOSPITAL | Age: 51
Discharge: HOME | End: 2021-10-26
Admitting: OBSTETRICS & GYNECOLOGY

## 2021-10-26 DIAGNOSIS — Z92.89 HISTORY OF MAMMOGRAPHY, SCREENING: ICD-10-CM

## 2021-10-26 PROCEDURE — 77067 SCR MAMMO BI INCL CAD: CPT

## 2021-10-26 PROCEDURE — 77063 BREAST TOMOSYNTHESIS BI: CPT

## 2021-10-28 ENCOUNTER — PREP FOR SURGERY (OUTPATIENT)
Dept: OTHER | Facility: HOSPITAL | Age: 51
End: 2021-10-28

## 2021-10-28 ENCOUNTER — OFFICE VISIT (OUTPATIENT)
Dept: SURGERY | Facility: CLINIC | Age: 51
End: 2021-10-28

## 2021-10-28 VITALS — BODY MASS INDEX: 39.48 KG/M2 | HEIGHT: 55 IN | WEIGHT: 170.6 LBS | HEART RATE: 74 BPM

## 2021-10-28 DIAGNOSIS — Z86.010 HISTORY OF COLONIC POLYPS: ICD-10-CM

## 2021-10-28 DIAGNOSIS — Z12.11 SCREENING FOR MALIGNANT NEOPLASM OF COLON: Primary | ICD-10-CM

## 2021-10-28 DIAGNOSIS — Z12.11 SCREENING FOR MALIGNANT NEOPLASM OF COLON: ICD-10-CM

## 2021-10-28 DIAGNOSIS — Z83.71 FAMILY HISTORY OF COLONIC POLYPS: Primary | ICD-10-CM

## 2021-10-28 PROBLEM — Z86.0100 HISTORY OF COLONIC POLYPS: Status: ACTIVE | Noted: 2021-10-28

## 2021-10-28 PROCEDURE — S0285 CNSLT BEFORE SCREEN COLONOSC: HCPCS | Performed by: NURSE PRACTITIONER

## 2021-10-28 RX ORDER — SODIUM CHLORIDE 0.9 % (FLUSH) 0.9 %
10 SYRINGE (ML) INJECTION AS NEEDED
Status: CANCELLED | OUTPATIENT
Start: 2021-10-28

## 2021-10-28 RX ORDER — SODIUM CHLORIDE 0.9 % (FLUSH) 0.9 %
3 SYRINGE (ML) INJECTION EVERY 12 HOURS SCHEDULED
Status: CANCELLED | OUTPATIENT
Start: 2021-10-28

## 2021-10-28 RX ORDER — SODIUM, POTASSIUM,MAG SULFATES 17.5-3.13G
SOLUTION, RECONSTITUTED, ORAL ORAL
Qty: 354 ML | Refills: 0 | Status: SHIPPED | OUTPATIENT
Start: 2021-10-28 | End: 2021-12-14 | Stop reason: HOSPADM

## 2021-10-28 NOTE — PROGRESS NOTES
Chief Complaint: Colonoscopy (colon polyps)    Subjective      Colonoscopy consultation         History of Present Illness  Katy Dominguez is a 51 y.o. female presents to [facility] for colonoscopy consultation.    Patient presents today without complaints for screening colonoscopy.    Patient denies any abdominal pain, change in bowel habit, or rectal bleeding.    Patient reports that her father and brother have had carpets of polyps.    Admits to history of colonic polyps.    Patient is at high risk for colon cancer.    12/20: Colonoscopy ([facility]): Normal colon.    10/19: Colonoscopy ([facility]): transverse - tubular & tubulovillous adenomas; cecum - Tubulovillous adenoma & serrated.    7/19: colonoscopy ([facility]): sigmoid - tubular adenoma; Cecum - tubular adenoma; cecum - Tubulovillous adenoma; Ascending - tubular adenoma; Hepatic flexure - 2 tubulovillous adenoma; Sigmoid - tubular adenoma.     Objective     Past Medical History:   Diagnosis Date   • Colon polyp    • HTN (hypertension)        Past Surgical History:   Procedure Laterality Date   • COLONOSCOPY  10/25/2019    Polyps 8 mm in the distal transverse colon, Polyp 5mm in the cecum   • ENDOMETRIAL ABLATION     • ENDOSCOPY  10/25/2019    Normal esophagus, Diffuse continuous erythema of the mucosa was noted in the antrum, Multiple cold forceps biopsies were performed for histology, Normal duodenum   • GALLBLADDER SURGERY         Outpatient Medications Marked as Taking for the 10/28/21 encounter (Office Visit) with María Mcintosh APRN   Medication Sig Dispense Refill   • aspirin (aspirin) 81 MG EC tablet Aspir-81 81 mg oral tablet,delayed release (DR/EC) take 1 tablet (81 mg) by oral route once daily   Active     • lisinopril (PRINIVIL,ZESTRIL) 5 MG tablet Take 1 tablet by mouth Daily. 90 tablet 1         Allergies   Allergen Reactions   • Penicillins Unknown - Low Severity        Family History   Problem Relation Age of  "Onset   • Heart disease Mother    • Hypertension Mother    • Rheum arthritis Mother    • Heart disease Father    • Hypertension Father    • Cancer Father         Myelodysplasia type 2   • Brain cancer Maternal Grandmother    • Diabetes type II Maternal Grandmother    • Heart disease Paternal Grandfather    • Hypertension Paternal Grandfather    • Heart disease Other    • Heart disease Other    • Cancer Other         Unspecified   • Diabetes type II Other        Social History     Socioeconomic History   • Marital status:    Tobacco Use   • Smoking status: Never Smoker   • Smokeless tobacco: Never Used   Substance and Sexual Activity   • Alcohol use: Never       Review of Systems   Constitutional: Negative for chills and fever.   Gastrointestinal: Negative for abdominal distention, abdominal pain, anal bleeding, blood in stool, constipation, diarrhea and rectal pain.        Vital Signs:   Pulse 74   Ht 132.1 cm (52\")   Wt 77.4 kg (170 lb 9.6 oz)   BMI 44.36 kg/m²      Physical Exam  Constitutional:       Appearance: Normal appearance.   HENT:      Head: Normocephalic.   Cardiovascular:      Rate and Rhythm: Normal rate.   Pulmonary:      Effort: Pulmonary effort is normal.   Abdominal:      General: Abdomen is flat.      Palpations: Abdomen is soft.   Skin:     General: Skin is warm and dry.   Neurological:      General: No focal deficit present.      Mental Status: She is alert and oriented to person, place, and time.   Psychiatric:         Mood and Affect: Mood normal.         Thought Content: Thought content normal.          Result Review :              []  Laboratory  []  Radiology  [x]  Pathology  []  Microbiology  []  EKG/Telemetry   []  Cardiology/Vascular   [x]  Old records  Today I have reviewed Dr. Gaines's previous office note, along with Dr. Orlando's previous operative reports and pathology's.     Assessment and Plan    Diagnoses and all orders for this visit:    1. Family history of colonic " polyps (Primary)  -     sodium-potassium-magnesium sulfates (Suprep Bowel Prep Kit) 17.5-3.13-1.6 GM/177ML solution oral solution; Take as directed. Instructions given in office. Disp: 2 bottles  Dispense: 354 mL; Refill: 0    2. Screening for malignant neoplasm of colon  -     sodium-potassium-magnesium sulfates (Suprep Bowel Prep Kit) 17.5-3.13-1.6 GM/177ML solution oral solution; Take as directed. Instructions given in office. Disp: 2 bottles  Dispense: 354 mL; Refill: 0    3. History of colonic polyps  -     sodium-potassium-magnesium sulfates (Suprep Bowel Prep Kit) 17.5-3.13-1.6 GM/177ML solution oral solution; Take as directed. Instructions given in office. Disp: 2 bottles  Dispense: 354 mL; Refill: 0        Follow Up   Return for Schedule colonoscopy with Dr. Gaines on 12/14 at [facility].     Hospital arrival time: 7:30 AM.    Possible risk/complications, benefits, and alternatives to surgical or invasive procedure have been explained to patient and/or legal guardian. Patient has been evaluated and can tolerate anesthesia and/or sedation. Risks, benefits, and alternatives to anesthesia and sedation have been explained to patient and/or legal guardian.       Patient was given instructions and counseling regarding her condition or for health maintenance advice. Please see specific information pulled into the AVS if appropriate.

## 2021-12-10 ENCOUNTER — TELEPHONE (OUTPATIENT)
Dept: SURGERY | Facility: CLINIC | Age: 51
End: 2021-12-10

## 2021-12-10 NOTE — TELEPHONE ENCOUNTER
Hub staff attempted to follow warm transfer process and was unsuccessful     Caller: Katy Dominguez    Relationship to patient: Self    Best call back number: [phone]    Patient is needing: TO SPEAK WITH MEDICAL STAFF REGARDING LIQUID SHE IS TAKING PRE SURGERY. SHE WANTS TO KNOW IF THERE IS A PILL FORM SHE COULD TAKE.         HUB UNABLE TO WARM TRANSFER

## 2021-12-14 ENCOUNTER — ANESTHESIA EVENT (OUTPATIENT)
Dept: GASTROENTEROLOGY | Facility: HOSPITAL | Age: 51
End: 2021-12-14

## 2021-12-14 ENCOUNTER — ANESTHESIA (OUTPATIENT)
Dept: GASTROENTEROLOGY | Facility: HOSPITAL | Age: 51
End: 2021-12-14

## 2021-12-14 ENCOUNTER — HOSPITAL ENCOUNTER (OUTPATIENT)
Facility: HOSPITAL | Age: 51
Setting detail: HOSPITAL OUTPATIENT SURGERY
Discharge: HOME | End: 2021-12-14
Attending: SURGERY | Admitting: SURGERY

## 2021-12-14 VITALS
HEIGHT: 62 IN | RESPIRATION RATE: 16 BRPM | DIASTOLIC BLOOD PRESSURE: 69 MMHG | HEART RATE: 54 BPM | SYSTOLIC BLOOD PRESSURE: 107 MMHG | TEMPERATURE: 98.2 F | BODY MASS INDEX: 29.37 KG/M2 | WEIGHT: 159.61 LBS | OXYGEN SATURATION: 98 %

## 2021-12-14 DIAGNOSIS — Z12.11 SCREENING FOR MALIGNANT NEOPLASM OF COLON: ICD-10-CM

## 2021-12-14 DIAGNOSIS — Z86.010 HISTORY OF COLONIC POLYPS: ICD-10-CM

## 2021-12-14 PROCEDURE — 25010000002 PROPOFOL 10 MG/ML EMULSION: Performed by: NURSE ANESTHETIST, CERTIFIED REGISTERED

## 2021-12-14 RX ORDER — LIDOCAINE HYDROCHLORIDE 20 MG/ML
INJECTION, SOLUTION INFILTRATION; PERINEURAL AS NEEDED
Status: DISCONTINUED | OUTPATIENT
Start: 2021-12-14 | End: 2021-12-14 | Stop reason: SURG

## 2021-12-14 RX ORDER — SODIUM CHLORIDE, SODIUM LACTATE, POTASSIUM CHLORIDE, CALCIUM CHLORIDE 600; 310; 30; 20 MG/100ML; MG/100ML; MG/100ML; MG/100ML
30 INJECTION, SOLUTION INTRAVENOUS CONTINUOUS
Status: DISCONTINUED | OUTPATIENT
Start: 2021-12-14 | End: 2021-12-14 | Stop reason: HOSPADM

## 2021-12-14 RX ORDER — SODIUM CHLORIDE 0.9 % (FLUSH) 0.9 %
10 SYRINGE (ML) INJECTION AS NEEDED
Status: DISCONTINUED | OUTPATIENT
Start: 2021-12-14 | End: 2021-12-14 | Stop reason: HOSPADM

## 2021-12-14 RX ORDER — SODIUM CHLORIDE 0.9 % (FLUSH) 0.9 %
3 SYRINGE (ML) INJECTION EVERY 12 HOURS SCHEDULED
Status: DISCONTINUED | OUTPATIENT
Start: 2021-12-14 | End: 2021-12-14 | Stop reason: HOSPADM

## 2021-12-14 RX ORDER — PROPOFOL 10 MG/ML
VIAL (ML) INTRAVENOUS AS NEEDED
Status: DISCONTINUED | OUTPATIENT
Start: 2021-12-14 | End: 2021-12-14 | Stop reason: SURG

## 2021-12-14 RX ADMIN — PROPOFOL 50 MG: 10 INJECTION, EMULSION INTRAVENOUS at 09:22

## 2021-12-14 RX ADMIN — LIDOCAINE HYDROCHLORIDE 60 MG: 20 INJECTION, SOLUTION INFILTRATION; PERINEURAL at 09:22

## 2021-12-14 RX ADMIN — SODIUM CHLORIDE, POTASSIUM CHLORIDE, SODIUM LACTATE AND CALCIUM CHLORIDE 30 ML/HR: 600; 310; 30; 20 INJECTION, SOLUTION INTRAVENOUS at 08:46

## 2021-12-14 RX ADMIN — PROPOFOL 175 MCG/KG/MIN: 10 INJECTION, EMULSION INTRAVENOUS at 09:22

## 2021-12-14 NOTE — ANESTHESIA POSTPROCEDURE EVALUATION
Patient: Katy Domingeuz    Procedure Summary     Date: 12/14/21 Room / Location: [facility] ENDOSCOPY 1 / [facility] ENDOSCOPY    Anesthesia Start: 0920 Anesthesia Stop: 0957    Procedure: COLONOSCOPY (N/A ) Diagnosis:       Screening for malignant neoplasm of colon      History of colonic polyps      (Screening for malignant neoplasm of colon [Z12.11])      (History of colonic polyps [Z86.010])    Surgeons: Duncan Gaines MD Provider: Jesus Rosado MD    Anesthesia Type: general ASA Status: 2          Anesthesia Type: general    Vitals  Vitals Value Taken Time   /69 12/14/21 1010   Temp 36.8 °C (98.2 °F) 12/14/21 1010   Pulse 55 12/14/21 1013   Resp 16 12/14/21 1010   SpO2 97 % 12/14/21 1013   Vitals shown include unvalidated device data.        Post Anesthesia Care and Evaluation    Patient location during evaluation: bedside  Patient participation: complete - patient participated  Level of consciousness: awake  Pain management: adequate  Airway patency: patent  Anesthetic complications: No anesthetic complications  PONV Status: none  Cardiovascular status: acceptable and stable  Respiratory status: acceptable  Hydration status: acceptable    Comments: An Anesthesiologist personally participated in the most demanding procedures (including induction and emergence if applicable) in the anesthesia plan, monitored the course of anesthesia administration at frequent intervals and remained physically present and available for immediate diagnosis and treatment of emergencies.

## 2021-12-14 NOTE — ANESTHESIA PREPROCEDURE EVALUATION
Anesthesia Evaluation     Patient summary reviewed and Nursing notes reviewed   no history of anesthetic complications:  NPO Solid Status: > 8 hours  NPO Liquid Status: > 2 hours           Airway   Mallampati: II  TM distance: >3 FB  Neck ROM: full  No difficulty expected  Dental      Pulmonary - negative pulmonary ROS and normal exam    breath sounds clear to auscultation  Cardiovascular - normal exam  Exercise tolerance: excellent (>7 METS)    Rhythm: regular    (+) hypertension,       Neuro/Psych- negative ROS  GI/Hepatic/Renal/Endo - negative ROS     Musculoskeletal     Abdominal    Substance History      OB/GYN          Other                        Anesthesia Plan    ASA 2     general   total IV anesthesia    Anesthetic plan, all risks, benefits, and alternatives have been provided, discussed and informed consent has been obtained with: patient.

## 2022-01-21 ENCOUNTER — TELEPHONE (OUTPATIENT)
Dept: SURGERY | Facility: CLINIC | Age: 52
End: 2022-01-21

## 2022-01-21 NOTE — TELEPHONE ENCOUNTER
Caller: CIRAA MARTIN    Relationship: SELF    Best call back number: [phone]    What does billing need from the patient: PATIENT RECEIVED A LETTER FROM BILLING STATING THAT HER INSURANCE DID NOT COVER THE COLONOSCOPY PERFORMED ON 12/14/21 BECAUSE IT WAS LISTED AS PREVENTATIVE INSTEAD OF DIAGNOSTIC. I PROVIDED THE BILLING # [phone], BUT SHE IS REQUESTING A CALL BACK TO FOLLOW UP ON THE CONCERN. SHE STATED THAT DURING HER CONSULTATION BEFORE THE PROCEDURE SOMEONE CALLED HER INSURANCE COMPANY AND CONFIRMED THAT IT WOULD BE COVERED AS LONG AS IT WAS MEDICALLY NECESSARY AND ESTHER GLASS REASSURED THAT SINCE THERE WAS A FAST GROWING CANCER POLYP IN HER PREVIOUS SCREENING THAT IT WOULD BE CONSIDERED NECESSARY.

## 2022-02-13 DIAGNOSIS — I10 ESSENTIAL HYPERTENSION: ICD-10-CM

## 2022-02-14 RX ORDER — LISINOPRIL 5 MG/1
TABLET ORAL
Qty: 14 TABLET | Refills: 0 | Status: SHIPPED | OUTPATIENT
Start: 2022-02-14 | End: 2022-02-21 | Stop reason: SDUPTHER

## 2022-02-21 ENCOUNTER — TELEPHONE (OUTPATIENT)
Dept: FAMILY MEDICINE CLINIC | Facility: CLINIC | Age: 52
End: 2022-02-21

## 2022-02-21 DIAGNOSIS — I10 ESSENTIAL HYPERTENSION: ICD-10-CM

## 2022-02-21 RX ORDER — LISINOPRIL 5 MG/1
5 TABLET ORAL DAILY
Qty: 30 TABLET | Refills: 1 | Status: SHIPPED | OUTPATIENT
Start: 2022-02-21 | End: 2022-04-04 | Stop reason: SDUPTHER

## 2022-02-21 NOTE — TELEPHONE ENCOUNTER
Caller: Katy Dominguez    Relationship to patient: Self    Best call back number: [phone]    Chief complaint: MEDICATION REFILL    Type of visit: MYCHART    Requested date: 02/24/22     Additional notes: THE PATIENT WOULD LIKE A CALL BACK TO SCHEDULE A VIRTUAL VISIT WITH PCP ASAP

## 2022-02-21 NOTE — TELEPHONE ENCOUNTER
Pt returned call, no openings this week, pt will be out of town next week and her insurance is also changing so she made her appointment for 3/31, the same day as her husbands and she wants to know if you will call in more lisinopril to get her thru until then, she states she has 12 pills left.  Helder Aponte

## 2022-04-04 ENCOUNTER — OFFICE VISIT (OUTPATIENT)
Dept: FAMILY MEDICINE CLINIC | Facility: CLINIC | Age: 52
End: 2022-04-04

## 2022-04-04 VITALS
WEIGHT: 162 LBS | HEIGHT: 62 IN | OXYGEN SATURATION: 97 % | DIASTOLIC BLOOD PRESSURE: 77 MMHG | RESPIRATION RATE: 18 BRPM | HEART RATE: 67 BPM | SYSTOLIC BLOOD PRESSURE: 128 MMHG | BODY MASS INDEX: 29.81 KG/M2 | TEMPERATURE: 97.9 F

## 2022-04-04 DIAGNOSIS — Z11.59 ENCOUNTER FOR HEPATITIS C SCREENING TEST FOR LOW RISK PATIENT: ICD-10-CM

## 2022-04-04 DIAGNOSIS — R79.89 ELEVATED TSH: ICD-10-CM

## 2022-04-04 DIAGNOSIS — B00.1 FEVER BLISTER: ICD-10-CM

## 2022-04-04 DIAGNOSIS — I10 ESSENTIAL HYPERTENSION: Primary | ICD-10-CM

## 2022-04-04 PROCEDURE — 99213 OFFICE O/P EST LOW 20 MIN: CPT | Performed by: NURSE PRACTITIONER

## 2022-04-04 RX ORDER — VALACYCLOVIR HYDROCHLORIDE 500 MG/1
500 TABLET, FILM COATED ORAL 2 TIMES DAILY
Qty: 60 TABLET | Refills: 5 | Status: SHIPPED | OUTPATIENT
Start: 2022-04-04

## 2022-04-04 RX ORDER — VALACYCLOVIR HYDROCHLORIDE 500 MG/1
500 TABLET, FILM COATED ORAL 2 TIMES DAILY
COMMUNITY
End: 2022-04-04 | Stop reason: SDUPTHER

## 2022-04-04 RX ORDER — LISINOPRIL 5 MG/1
5 TABLET ORAL DAILY
Qty: 90 TABLET | Refills: 1 | Status: SHIPPED | OUTPATIENT
Start: 2022-04-04

## 2022-04-04 NOTE — PROGRESS NOTES
Answers for HPI/ROS submitted by the patient on 3/30/2022  What is the primary reason for your visit?: High Blood Pressure    Chief Complaint  Hypertension    Subjective          Katy Dominguez presents to [facility]  History of Present Illness  HTN;  She is currently on lisinopril and is doing well overall.  She tried coming off the medication though the blood pressure went elevated so therefore she did go back on the blood pressure medicine.  She has stabilized weight also.  She is getting flippy feeling at times but not all the time.  She still gets the pulse feeling in the ears a lot.  She is getting ready to head to [place] to see her new grandbaby with her .  She has a fever blister on her lip that came up and she is requesting a refill for the Valtrex.  She does have a history of shingles in the past.  She has not had any new cases of shingles recently.  Depression: Not at risk   • PHQ-2 Score: 0           Allergies  Penicillins    Social History     Tobacco Use   • Smoking status: Never Smoker   • Smokeless tobacco: Never Used   Vaping Use   • Vaping Use: Never used   Substance Use Topics   • Alcohol use: Not Currently     Comment: Rarely    • Drug use: Never       Family History   Problem Relation Age of Onset   • Heart disease Mother         Afib   • Hypertension Mother    • Rheum arthritis Mother    • Arthritis Mother    • Hearing loss Mother    • Thyroid disease Mother    • Heart disease Father    • Hypertension Father    • Cancer Father         Mylodysplasia and multiple myeloma   • Hearing loss Father    • Brain cancer Maternal Grandmother    • Diabetes type II Maternal Grandmother    • Heart disease Paternal Grandfather    • Hypertension Paternal Grandfather    • Heart disease Other    • Heart disease Other    • Cancer Other         Unspecified   • Diabetes type II Other    • Malig Hyperthermia Neg Hx         Health Maintenance Due   Topic Date Due  "  • ANNUAL PHYSICAL  Never done   • COVID-19 Vaccine (1) 05/13/1975   • HEPATITIS C SCREENING  Never done        Immunization History   Administered Date(s) Administered   • Influenza, Unspecified 10/01/2020       Review of Systems   Constitutional: Negative for fatigue.   Respiratory: Negative for cough and shortness of breath.    Cardiovascular: Negative for chest pain.   Gastrointestinal: Negative for diarrhea, nausea and vomiting.        Objective       Vitals:    04/04/22 1518   BP: 128/77   Pulse: 67   Resp: 18   Temp: 97.9 °F (36.6 °C)   SpO2: 97%   Weight: 73.5 kg (162 lb)   Height: 157.5 cm (62\")       Body mass index is 29.63 kg/m².         Physical Exam  Vitals reviewed.   Constitutional:       Appearance: Normal appearance. She is well-developed.   Cardiovascular:      Rate and Rhythm: Normal rate and regular rhythm.      Heart sounds: Normal heart sounds. No murmur heard.  Pulmonary:      Effort: Pulmonary effort is normal.      Breath sounds: Normal breath sounds.   Neurological:      Mental Status: She is alert and oriented to person, place, and time.      Cranial Nerves: No cranial nerve deficit.      Motor: No weakness.   Psychiatric:         Mood and Affect: Mood and affect normal.             Result Review :     The following data was reviewed by: ESTHER Carrero on 04/04/2022:    Common labs    Common Labsle 7/1/21   Glucose 83   BUN 9   Creatinine 0.60   eGFR Non African Am 105   Sodium 138   Potassium 4.0   Chloride 103   Calcium 8.8   Albumin 4.20   Total Bilirubin 0.2   Alkaline Phosphatase 76   AST (SGOT) 18   ALT (SGPT) 15                              Assessment and Plan      Diagnoses and all orders for this visit:    1. Essential hypertension (Primary)  -     lisinopril (PRINIVIL,ZESTRIL) 5 MG tablet; Take 1 tablet by mouth Daily.  Dispense: 90 tablet; Refill: 1  -     valACYclovir (VALTREX) 500 MG tablet; Take 1 tablet by mouth 2 (Two) Times a Day.  Dispense: 60 tablet; " Refill: 5  -     Comprehensive metabolic panel; Future  -     Lipid panel; Future  -     CBC w AUTO Differential; Future  -     TSH; Future    2. Fever blister    3. Encounter for hepatitis C screening test for low risk patient  -     Hepatitis C antibody; Future            Follow Up     Return in about 6 months (around 10/4/2022).  Follow-up in 6 months for labs and appt. Call with any concerns or questions that you may have regarding your medications or history.    I have reviewed all medications and at this time no medications changes need to be adjusted for all chronic conditions.  She will do her labs soon.  She is aware for the fever blisters she will do 2 tablets 3 times a day for the first 2 days then back down to daily for the fever blister.  Patient was given instructions and counseling regarding her condition or for health maintenance advice. Please see specific information pulled into the AVS if appropriate.         Gisella Velazquez, ESTHER  04/04/2022

## 2022-04-05 ENCOUNTER — LAB (OUTPATIENT)
Dept: LAB | Facility: HOSPITAL | Age: 52
End: 2022-04-05

## 2022-04-05 DIAGNOSIS — I10 ESSENTIAL HYPERTENSION: ICD-10-CM

## 2022-04-05 DIAGNOSIS — Z11.59 ENCOUNTER FOR HEPATITIS C SCREENING TEST FOR LOW RISK PATIENT: ICD-10-CM

## 2022-04-05 LAB
ALBUMIN SERPL-MCNC: 4.2 G/DL (ref 3.5–5.2)
ALBUMIN/GLOB SERPL: 1.8 G/DL
ALP SERPL-CCNC: 72 U/L (ref 39–117)
ALT SERPL W P-5'-P-CCNC: 14 U/L (ref 1–33)
ANION GAP SERPL CALCULATED.3IONS-SCNC: 7.1 MMOL/L (ref 5–15)
AST SERPL-CCNC: 18 U/L (ref 1–32)
BASOPHILS # BLD AUTO: 0.04 10*3/MM3 (ref 0–0.2)
BASOPHILS NFR BLD AUTO: 0.6 % (ref 0–1.5)
BILIRUB SERPL-MCNC: 0.5 MG/DL (ref 0–1.2)
BUN SERPL-MCNC: 12 MG/DL (ref 6–20)
BUN/CREAT SERPL: 19 (ref 7–25)
CALCIUM SPEC-SCNC: 9 MG/DL (ref 8.6–10.5)
CHLORIDE SERPL-SCNC: 106 MMOL/L (ref 98–107)
CHOLEST SERPL-MCNC: 153 MG/DL (ref 0–200)
CO2 SERPL-SCNC: 26.9 MMOL/L (ref 22–29)
CREAT SERPL-MCNC: 0.63 MG/DL (ref 0.57–1)
DEPRECATED RDW RBC AUTO: 41.9 FL (ref 37–54)
EGFRCR SERPLBLD CKD-EPI 2021: 106.2 ML/MIN/1.73
EOSINOPHIL # BLD AUTO: 0.19 10*3/MM3 (ref 0–0.4)
EOSINOPHIL NFR BLD AUTO: 3.1 % (ref 0.3–6.2)
ERYTHROCYTE [DISTWIDTH] IN BLOOD BY AUTOMATED COUNT: 11.9 % (ref 12.3–15.4)
GLOBULIN UR ELPH-MCNC: 2.4 GM/DL
GLUCOSE SERPL-MCNC: 88 MG/DL (ref 65–99)
HCT VFR BLD AUTO: 42 % (ref 34–46.6)
HCV AB SER DONR QL: NORMAL
HDLC SERPL-MCNC: 59 MG/DL (ref 40–60)
HGB BLD-MCNC: 13.8 G/DL (ref 12–15.9)
IMM GRANULOCYTES # BLD AUTO: 0.01 10*3/MM3 (ref 0–0.05)
IMM GRANULOCYTES NFR BLD AUTO: 0.2 % (ref 0–0.5)
LDLC SERPL CALC-MCNC: 83 MG/DL (ref 0–100)
LDLC/HDLC SERPL: 1.42 {RATIO}
LYMPHOCYTES # BLD AUTO: 2.49 10*3/MM3 (ref 0.7–3.1)
LYMPHOCYTES NFR BLD AUTO: 40 % (ref 19.6–45.3)
MCH RBC QN AUTO: 31.2 PG (ref 26.6–33)
MCHC RBC AUTO-ENTMCNC: 32.9 G/DL (ref 31.5–35.7)
MCV RBC AUTO: 94.8 FL (ref 79–97)
MONOCYTES # BLD AUTO: 0.4 10*3/MM3 (ref 0.1–0.9)
MONOCYTES NFR BLD AUTO: 6.4 % (ref 5–12)
NEUTROPHILS NFR BLD AUTO: 3.09 10*3/MM3 (ref 1.7–7)
NEUTROPHILS NFR BLD AUTO: 49.7 % (ref 42.7–76)
NRBC BLD AUTO-RTO: 0 /100 WBC (ref 0–0.2)
PLATELET # BLD AUTO: 173 10*3/MM3 (ref 140–450)
PMV BLD AUTO: 12.4 FL (ref 6–12)
POTASSIUM SERPL-SCNC: 3.9 MMOL/L (ref 3.5–5.2)
PROT SERPL-MCNC: 6.6 G/DL (ref 6–8.5)
RBC # BLD AUTO: 4.43 10*6/MM3 (ref 3.77–5.28)
SODIUM SERPL-SCNC: 140 MMOL/L (ref 136–145)
TRIGL SERPL-MCNC: 52 MG/DL (ref 0–150)
TSH SERPL DL<=0.05 MIU/L-ACNC: 5.21 UIU/ML (ref 0.27–4.2)
VLDLC SERPL-MCNC: 11 MG/DL (ref 5–40)
WBC NRBC COR # BLD: 6.22 10*3/MM3 (ref 3.4–10.8)

## 2022-04-05 PROCEDURE — 86803 HEPATITIS C AB TEST: CPT

## 2022-04-05 PROCEDURE — 80050 GENERAL HEALTH PANEL: CPT

## 2022-04-05 PROCEDURE — 80061 LIPID PANEL: CPT

## 2022-04-05 PROCEDURE — 36415 COLL VENOUS BLD VENIPUNCTURE: CPT

## 2022-05-24 ENCOUNTER — LAB (OUTPATIENT)
Dept: LAB | Facility: HOSPITAL | Age: 52
End: 2022-05-24

## 2022-05-24 DIAGNOSIS — R79.89 ELEVATED TSH: ICD-10-CM

## 2022-05-24 LAB
T3FREE SERPL-MCNC: 2.81 PG/ML (ref 2–4.4)
T4 FREE SERPL-MCNC: 0.89 NG/DL (ref 0.93–1.7)
TSH SERPL DL<=0.05 MIU/L-ACNC: 3.69 UIU/ML (ref 0.27–4.2)

## 2022-05-24 PROCEDURE — 84443 ASSAY THYROID STIM HORMONE: CPT

## 2022-05-24 PROCEDURE — 36415 COLL VENOUS BLD VENIPUNCTURE: CPT

## 2022-05-24 PROCEDURE — 84439 ASSAY OF FREE THYROXINE: CPT

## 2022-05-24 PROCEDURE — 84481 FREE ASSAY (FT-3): CPT

## 2022-05-24 PROCEDURE — 86376 MICROSOMAL ANTIBODY EACH: CPT

## 2022-05-24 PROCEDURE — 86800 THYROGLOBULIN ANTIBODY: CPT

## 2022-05-25 LAB
THYROGLOB AB SERPL-ACNC: <1 IU/ML (ref 0–0.9)
THYROPEROXIDASE AB SERPL-ACNC: 537 IU/ML (ref 0–34)

## 2022-05-26 DIAGNOSIS — R79.89 ELEVATED TSH: Primary | ICD-10-CM

## 2022-06-29 ENCOUNTER — OFFICE VISIT (OUTPATIENT)
Dept: FAMILY MEDICINE CLINIC | Facility: CLINIC | Age: 52
End: 2022-06-29

## 2022-06-29 DIAGNOSIS — U07.1 COVID: Primary | ICD-10-CM

## 2022-06-29 DIAGNOSIS — R05.9 COUGH: ICD-10-CM

## 2022-06-29 PROCEDURE — 99213 OFFICE O/P EST LOW 20 MIN: CPT | Performed by: NURSE PRACTITIONER

## 2022-06-29 RX ORDER — GUAIFENESIN 600 MG/1
1200 TABLET, EXTENDED RELEASE ORAL 2 TIMES DAILY
Qty: 120 TABLET | Refills: 0 | Status: SHIPPED | OUTPATIENT
Start: 2022-06-29

## 2022-06-29 RX ORDER — DEXTROMETHORPHAN HYDROBROMIDE AND PROMETHAZINE HYDROCHLORIDE 15; 6.25 MG/5ML; MG/5ML
SYRUP ORAL
Qty: 240 ML | Refills: 0 | Status: SHIPPED | OUTPATIENT
Start: 2022-06-29

## 2022-06-29 RX ORDER — BENZONATATE 100 MG/1
100 CAPSULE ORAL 3 TIMES DAILY PRN
Qty: 30 CAPSULE | Refills: 0 | Status: SHIPPED | OUTPATIENT
Start: 2022-06-29

## 2022-06-29 RX ORDER — DEXAMETHASONE 6 MG/1
6 TABLET ORAL DAILY
Qty: 6 TABLET | Refills: 0 | Status: SHIPPED | OUTPATIENT
Start: 2022-06-29 | End: 2022-07-05

## 2022-06-29 NOTE — PROGRESS NOTES
Answers for HPI/ROS submitted by the patient on 6/29/2022  What is the primary reason for your visit?: Sore Throat      Chief Complaint  Cough, Sore Throat, Nasal Congestion, and Fever (Tested positive for COVID at home today /Knot left side neck )    Subjective         Katy Dominguez presents to [facility]  History of Present Illness    Mode of Visit: Video doximity  Location of patient: home  You have chosen to receive care through a telehealth visit.  Does the patient consent to use a video/audio connection for your medical care today? Yes  The visit included audio and video interaction. No technical issues occurred during this visit.   Her  is also on the video with us today.  She started with symptoms on Sunday and tested at the home test with Monday and she was negative.  She then started with a low-grade fever of 99 and has been taking Mucinex and ibuprofen for the cold chills and the sweats.  She has had body aches and dry cough and congestion.  She has been retested today and she is COVID-positive.  She has had ear pain and she has this area underneath of her neck that showed up.  She has been also taking Coricidin cough and cold.  She feels that the cough is not into her chest but it is hurting her chest when she is coughing.  No vomiting noted.  They are eating and drinking.  Objective   Vital Signs:   There were no vitals taken for this visit.    Physical Exam   Constitutional: She appears well-developed and well-nourished.   HENT:   Head: Normocephalic.   Pulmonary/Chest: Effort normal.  No respiratory distress.  Neurological: She is alert. No cranial nerve deficit.   Skin: No bruising and no rash noted.   There looks to be a enlarged lymph node on the left side of the neck by video   Psychiatric: She has a normal mood and affect. Her speech is normal and behavior is normal. Thought content normal.     Result Review :                 Assessment and Plan    Diagnoses and all orders for this visit:    1. COVID (Primary)  -     dexamethasone (DECADRON) 6 MG tablet; Take 1 tablet by mouth Daily for 6 days.  Dispense: 6 tablet; Refill: 0  -     promethazine-dextromethorphan (PROMETHAZINE-DM) 6.25-15 MG/5ML syrup; 5-10 cc at bedtime prn  Dispense: 240 mL; Refill: 0  -     guaiFENesin (Mucinex) 600 MG 12 hr tablet; Take 2 tablets by mouth 2 (Two) Times a Day.  Dispense: 120 tablet; Refill: 0  -     benzonatate (Tessalon Perles) 100 MG capsule; Take 1 capsule by mouth 3 (Three) Times a Day As Needed for Cough.  Dispense: 30 capsule; Refill: 0    2. Cough        Follow Up   Return if symptoms worsen or fail to improve.    Call or return to clinic PRN if these symptoms worsen or fail to improve as anticipated.  Patient will take steroids, cough medicine, Mucinex.  She will let me know how she is doing Friday and Saturday.  If the symptoms continue to linger over a 5 to 7-day timeframe we will do antibiotics at that time.  Push the fluids.  Continue taking ibuprofen Tylenol.  Call with questions or concerns.  We discussed about the quarantining of 5 days from the positive test then we test and if still positive continue wearing a mask for an additional 5 days.  We may continue inhalers or nebulizers if needed in a few days.   Patient was given instructions and counseling regarding her condition or for health maintenance advice. Please see specific information pulled into the AVS if appropriate.     Gisella Velazquez, APRN  06/29/2022

## 2022-07-06 ENCOUNTER — HOSPITAL ENCOUNTER (OUTPATIENT)
Dept: GENERAL RADIOLOGY | Facility: HOSPITAL | Age: 52
Discharge: HOME | End: 2022-07-06
Admitting: NURSE PRACTITIONER

## 2022-07-06 ENCOUNTER — OFFICE VISIT (OUTPATIENT)
Dept: FAMILY MEDICINE CLINIC | Facility: CLINIC | Age: 52
End: 2022-07-06

## 2022-07-06 VITALS
RESPIRATION RATE: 20 BRPM | DIASTOLIC BLOOD PRESSURE: 80 MMHG | WEIGHT: 160.2 LBS | BODY MASS INDEX: 29.48 KG/M2 | TEMPERATURE: 98 F | SYSTOLIC BLOOD PRESSURE: 120 MMHG | HEART RATE: 66 BPM | OXYGEN SATURATION: 97 % | HEIGHT: 62 IN

## 2022-07-06 DIAGNOSIS — R05.9 COUGH: ICD-10-CM

## 2022-07-06 DIAGNOSIS — U07.1 COVID: Primary | ICD-10-CM

## 2022-07-06 DIAGNOSIS — U07.1 COVID: ICD-10-CM

## 2022-07-06 DIAGNOSIS — R11.0 NAUSEA: ICD-10-CM

## 2022-07-06 DIAGNOSIS — K59.00 CONSTIPATION, UNSPECIFIED CONSTIPATION TYPE: ICD-10-CM

## 2022-07-06 PROCEDURE — 99213 OFFICE O/P EST LOW 20 MIN: CPT | Performed by: NURSE PRACTITIONER

## 2022-07-06 PROCEDURE — 71046 X-RAY EXAM CHEST 2 VIEWS: CPT

## 2022-07-06 RX ORDER — ONDANSETRON HYDROCHLORIDE 8 MG/1
8 TABLET, FILM COATED ORAL EVERY 8 HOURS PRN
Qty: 20 TABLET | Refills: 0 | Status: SHIPPED | OUTPATIENT
Start: 2022-07-06

## 2022-07-06 NOTE — PROGRESS NOTES
Chief Complaint  Constipation, Nausea, and Vomiting (This morning)    Subjective          Katy Dominguez presents to [facility]  History of Present Illness  She got diagnosed with COVID with a home test last week.  I did a televisit with him on Wednesday and did medications.  She now messaged back over the weekend and started to feel worse and therefore I started her on doxycycline and Symbicort.  She did not start the Symbicort but she did start the doxycycline.  The Doxy has caused her nausea and this morning she did have vomiting.  She has had constipation also in the last 10 days she is only had 2 bowel movements and she usually takes a soothing tea and then only worked once that has not worked since.  She did have bright red blood x2 with a bowel movement and once without the bowel movement in the last 48 hours.  She is still coughing she still feels tight in the chest.      Allergies  Penicillins    Social History     Tobacco Use   • Smoking status: Never Smoker   • Smokeless tobacco: Never Used   Vaping Use   • Vaping Use: Never used   Substance Use Topics   • Alcohol use: Never     Comment: Rarely    • Drug use: Never       Family History   Problem Relation Age of Onset   • Heart disease Mother         Afib   • Hypertension Mother    • Rheum arthritis Mother    • Arthritis Mother    • Hearing loss Mother    • Thyroid disease Mother    • Heart disease Father    • Hypertension Father    • Cancer Father         Mylodysplasia and multiple myeloma   • Hearing loss Father    • Brain cancer Maternal Grandmother    • Diabetes type II Maternal Grandmother    • Heart disease Paternal Grandfather    • Hypertension Paternal Grandfather    • Heart disease Other    • Heart disease Other    • Cancer Other         Unspecified   • Diabetes type II Other    • Malig Hyperthermia Neg Hx         Health Maintenance Due   Topic Date Due   • ANNUAL PHYSICAL  Never done        Immunization  "History   Administered Date(s) Administered   • COVID-19 (MODERNA) 1st, 2nd, 3rd Dose Only 03/19/2021, 04/16/2021   • COVID-19 (MODERNA) BOOSTER 11/17/2021, 05/31/2022   • Flublok 18+yrs 10/12/2020, 10/28/2021   • Influenza, Unspecified 10/01/2020   • Tdap 12/02/2019       Review of Systems   Constitutional: Positive for fatigue.   Respiratory: Positive for cough and shortness of breath.    Cardiovascular: Negative for chest pain.   Gastrointestinal: Positive for constipation, nausea and vomiting. Negative for diarrhea.        Objective       Vitals:    07/06/22 1117   BP: 120/80   Pulse: 66   Resp: 20   Temp: 98 °F (36.7 °C)   SpO2: 97%   Weight: 72.7 kg (160 lb 3.2 oz)   Height: 157.5 cm (62\")       Body mass index is 29.3 kg/m².         Physical Exam  Vitals reviewed.   Constitutional:       Appearance: Normal appearance. She is well-developed.   Cardiovascular:      Rate and Rhythm: Normal rate and regular rhythm.      Heart sounds: Normal heart sounds. No murmur heard.  Pulmonary:      Effort: Pulmonary effort is normal.      Breath sounds: Rhonchi present.   Neurological:      Mental Status: She is alert and oriented to person, place, and time.      Cranial Nerves: No cranial nerve deficit.      Motor: No weakness.   Psychiatric:         Mood and Affect: Mood and affect normal.             Result Review :     The following data was reviewed by: ESTHER Carrero on 07/06/2022:                     Assessment and Plan      Diagnoses and all orders for this visit:    1. COVID (Primary)  -     XR Chest PA & Lateral; Future    2. Cough  -     XR Chest PA & Lateral; Future    3. Constipation, unspecified constipation type    4. Nausea  -     ondansetron (Zofran) 8 MG tablet; Take 1 tablet by mouth Every 8 (Eight) Hours As Needed for Nausea or Vomiting.  Dispense: 20 tablet; Refill: 0  -     linaclotide (Linzess) 145 MCG capsule capsule; Take 1 capsule by mouth Every Morning Before Breakfast.  Dispense: 30 " capsule; Refill: 0            Follow Up     Return if symptoms worsen or fail to improve.  We will do chest x-ray, Linzess, Zofran and continue on the doxycycline.  Let me know how she is doing in the next 24 to 48 hours.  Discussed that if she is still having a little bit of the cough in 2 to 3 days to go ahead and start the Symbicort.  Discussed that the cough could last up to 3 to 4 months as a dry nagging cough.  Call with questions or concerns.  Push the fluids.  Patient was given instructions and counseling regarding her condition or for health maintenance advice. Please see specific information pulled into the AVS if appropriate.         Gisella Velazquez, APRN  07/06/2022

## 2022-07-19 ENCOUNTER — TRANSCRIBE ORDERS (OUTPATIENT)
Dept: ADMINISTRATIVE | Facility: HOSPITAL | Age: 52
End: 2022-07-19

## 2022-07-19 DIAGNOSIS — E05.90 HYPERTHYROIDISM: Primary | ICD-10-CM

## 2022-07-25 ENCOUNTER — APPOINTMENT (OUTPATIENT)
Dept: ULTRASOUND IMAGING | Facility: HOSPITAL | Age: 52
End: 2022-07-25

## 2022-08-23 ENCOUNTER — LAB (OUTPATIENT)
Dept: LAB | Facility: HOSPITAL | Age: 52
End: 2022-08-23

## 2022-08-23 ENCOUNTER — TRANSCRIBE ORDERS (OUTPATIENT)
Dept: LAB | Facility: HOSPITAL | Age: 52
End: 2022-08-23

## 2022-08-23 DIAGNOSIS — R20.0 NUMBNESS: ICD-10-CM

## 2022-08-23 DIAGNOSIS — E03.9 ADULT HYPOTHYROIDISM: ICD-10-CM

## 2022-08-23 DIAGNOSIS — E03.9 ADULT HYPOTHYROIDISM: Primary | ICD-10-CM

## 2022-08-23 LAB
T3FREE SERPL-MCNC: 2.95 PG/ML (ref 2–4.4)
T4 FREE SERPL-MCNC: 1.02 NG/DL (ref 0.93–1.7)
TSH SERPL DL<=0.05 MIU/L-ACNC: 1.42 UIU/ML (ref 0.27–4.2)
VIT B12 BLD-MCNC: 418 PG/ML (ref 211–946)

## 2022-08-23 PROCEDURE — 84443 ASSAY THYROID STIM HORMONE: CPT

## 2022-08-23 PROCEDURE — 83921 ORGANIC ACID SINGLE QUANT: CPT

## 2022-08-23 PROCEDURE — 82607 VITAMIN B-12: CPT

## 2022-08-23 PROCEDURE — 84439 ASSAY OF FREE THYROXINE: CPT

## 2022-08-23 PROCEDURE — 84481 FREE ASSAY (FT-3): CPT

## 2022-08-23 PROCEDURE — 36415 COLL VENOUS BLD VENIPUNCTURE: CPT

## 2022-08-27 LAB — METHYLMALONATE SERPL-SCNC: 283 NMOL/L (ref 0–378)

## 2022-09-19 ENCOUNTER — TRANSCRIBE ORDERS (OUTPATIENT)
Dept: ADMINISTRATIVE | Facility: HOSPITAL | Age: 52
End: 2022-09-19

## 2022-09-19 DIAGNOSIS — Z12.31 VISIT FOR SCREENING MAMMOGRAM: Primary | ICD-10-CM

## 2022-11-14 ENCOUNTER — HOSPITAL ENCOUNTER (OUTPATIENT)
Dept: MAMMOGRAPHY | Facility: HOSPITAL | Age: 52
Discharge: HOME | End: 2022-11-14
Admitting: OBSTETRICS & GYNECOLOGY

## 2022-11-14 DIAGNOSIS — Z12.31 VISIT FOR SCREENING MAMMOGRAM: ICD-10-CM

## 2022-11-14 PROCEDURE — 77063 BREAST TOMOSYNTHESIS BI: CPT

## 2022-11-14 PROCEDURE — 77067 SCR MAMMO BI INCL CAD: CPT

## 2023-02-13 ENCOUNTER — TRANSCRIBE ORDERS (OUTPATIENT)
Dept: LAB | Facility: HOSPITAL | Age: 53
End: 2023-02-13
Payer: COMMERCIAL

## 2023-02-13 ENCOUNTER — LAB (OUTPATIENT)
Dept: LAB | Facility: HOSPITAL | Age: 53
End: 2023-02-13
Payer: COMMERCIAL

## 2023-02-13 DIAGNOSIS — E03.9 HYPOTHYROIDISM, UNSPECIFIED TYPE: ICD-10-CM

## 2023-02-13 DIAGNOSIS — E03.9 HYPOTHYROIDISM, UNSPECIFIED TYPE: Primary | ICD-10-CM

## 2023-02-13 LAB — TSH SERPL DL<=0.05 MIU/L-ACNC: 2.25 UIU/ML (ref 0.27–4.2)

## 2023-02-13 PROCEDURE — 84443 ASSAY THYROID STIM HORMONE: CPT

## 2023-02-13 PROCEDURE — 84432 ASSAY OF THYROGLOBULIN: CPT

## 2023-02-13 PROCEDURE — 86800 THYROGLOBULIN ANTIBODY: CPT

## 2023-02-13 PROCEDURE — 86376 MICROSOMAL ANTIBODY EACH: CPT

## 2023-02-13 PROCEDURE — 36415 COLL VENOUS BLD VENIPUNCTURE: CPT

## 2023-02-14 LAB — THYROPEROXIDASE AB SERPL-ACNC: 424 IU/ML (ref 0–34)

## 2023-02-15 LAB — THYROGLOB AB SERPL-ACNC: <1 IU/ML (ref 0–0.9)

## 2023-02-20 LAB — THYROGLOB SERPL-MCNC: 39.8 NG/ML (ref 1.5–38.5)

## 2023-05-18 ENCOUNTER — OFFICE VISIT (OUTPATIENT)
Dept: FAMILY MEDICINE CLINIC | Facility: CLINIC | Age: 53
End: 2023-05-18
Payer: COMMERCIAL

## 2023-05-18 VITALS
RESPIRATION RATE: 18 BRPM | OXYGEN SATURATION: 99 % | SYSTOLIC BLOOD PRESSURE: 128 MMHG | WEIGHT: 160.7 LBS | BODY MASS INDEX: 29.57 KG/M2 | DIASTOLIC BLOOD PRESSURE: 73 MMHG | TEMPERATURE: 98.3 F | HEIGHT: 62 IN | HEART RATE: 65 BPM

## 2023-05-18 DIAGNOSIS — Z00.00 PHYSICAL EXAM, ANNUAL: Primary | ICD-10-CM

## 2023-05-18 LAB
ALBUMIN SERPL-MCNC: 4.3 G/DL (ref 3.5–5.2)
ALBUMIN/GLOB SERPL: 1.7 G/DL
ALP SERPL-CCNC: 73 U/L (ref 39–117)
ALT SERPL W P-5'-P-CCNC: 12 U/L (ref 1–33)
ANION GAP SERPL CALCULATED.3IONS-SCNC: 8.3 MMOL/L (ref 5–15)
AST SERPL-CCNC: 21 U/L (ref 1–32)
BASOPHILS # BLD AUTO: 0.04 10*3/MM3 (ref 0–0.2)
BASOPHILS NFR BLD AUTO: 0.8 % (ref 0–1.5)
BILIRUB SERPL-MCNC: 0.7 MG/DL (ref 0–1.2)
BUN SERPL-MCNC: 9 MG/DL (ref 6–20)
BUN/CREAT SERPL: 13.8 (ref 7–25)
CALCIUM SPEC-SCNC: 9.9 MG/DL (ref 8.6–10.5)
CHLORIDE SERPL-SCNC: 104 MMOL/L (ref 98–107)
CHOLEST SERPL-MCNC: 166 MG/DL (ref 0–200)
CO2 SERPL-SCNC: 27.7 MMOL/L (ref 22–29)
CREAT SERPL-MCNC: 0.65 MG/DL (ref 0.57–1)
DEPRECATED RDW RBC AUTO: 40.8 FL (ref 37–54)
EGFRCR SERPLBLD CKD-EPI 2021: 104.1 ML/MIN/1.73
EOSINOPHIL # BLD AUTO: 0.1 10*3/MM3 (ref 0–0.4)
EOSINOPHIL NFR BLD AUTO: 1.9 % (ref 0.3–6.2)
ERYTHROCYTE [DISTWIDTH] IN BLOOD BY AUTOMATED COUNT: 11.9 % (ref 12.3–15.4)
GLOBULIN UR ELPH-MCNC: 2.6 GM/DL
GLUCOSE SERPL-MCNC: 84 MG/DL (ref 65–99)
HCT VFR BLD AUTO: 41 % (ref 34–46.6)
HDLC SERPL-MCNC: 65 MG/DL (ref 40–60)
HGB BLD-MCNC: 13.9 G/DL (ref 12–15.9)
IMM GRANULOCYTES # BLD AUTO: 0 10*3/MM3 (ref 0–0.05)
IMM GRANULOCYTES NFR BLD AUTO: 0 % (ref 0–0.5)
LDLC SERPL CALC-MCNC: 92 MG/DL (ref 0–100)
LDLC/HDLC SERPL: 1.42 {RATIO}
LYMPHOCYTES # BLD AUTO: 1.96 10*3/MM3 (ref 0.7–3.1)
LYMPHOCYTES NFR BLD AUTO: 37.8 % (ref 19.6–45.3)
MCH RBC QN AUTO: 31.4 PG (ref 26.6–33)
MCHC RBC AUTO-ENTMCNC: 33.9 G/DL (ref 31.5–35.7)
MCV RBC AUTO: 92.8 FL (ref 79–97)
MONOCYTES # BLD AUTO: 0.38 10*3/MM3 (ref 0.1–0.9)
MONOCYTES NFR BLD AUTO: 7.3 % (ref 5–12)
NEUTROPHILS NFR BLD AUTO: 2.7 10*3/MM3 (ref 1.7–7)
NEUTROPHILS NFR BLD AUTO: 52.2 % (ref 42.7–76)
NRBC BLD AUTO-RTO: 0 /100 WBC (ref 0–0.2)
PLATELET # BLD AUTO: 176 10*3/MM3 (ref 140–450)
PMV BLD AUTO: 12.3 FL (ref 6–12)
POTASSIUM SERPL-SCNC: 4.5 MMOL/L (ref 3.5–5.2)
PROT SERPL-MCNC: 6.9 G/DL (ref 6–8.5)
RBC # BLD AUTO: 4.42 10*6/MM3 (ref 3.77–5.28)
SODIUM SERPL-SCNC: 140 MMOL/L (ref 136–145)
TRIGL SERPL-MCNC: 42 MG/DL (ref 0–150)
TSH SERPL DL<=0.05 MIU/L-ACNC: 1.72 UIU/ML (ref 0.27–4.2)
VLDLC SERPL-MCNC: 9 MG/DL (ref 5–40)
WBC NRBC COR # BLD: 5.18 10*3/MM3 (ref 3.4–10.8)

## 2023-05-18 PROCEDURE — 99396 PREV VISIT EST AGE 40-64: CPT | Performed by: NURSE PRACTITIONER

## 2023-05-18 PROCEDURE — 80050 GENERAL HEALTH PANEL: CPT | Performed by: NURSE PRACTITIONER

## 2023-05-18 PROCEDURE — 80061 LIPID PANEL: CPT | Performed by: NURSE PRACTITIONER

## 2023-05-18 RX ORDER — LOSARTAN POTASSIUM 50 MG/1
TABLET ORAL
COMMUNITY
Start: 2023-02-16

## 2023-05-18 NOTE — PROGRESS NOTES
Chief Complaint  Annual Exam    Subjective          Katy Dominguez presents to [facility]  History of Present Illness  Here for physical.  Pap: Dr Gaitan does her Pap smear.  She has not gotten the results but she also did not hear any news so she feels that the Pap is normal  Dentist: She goes to the dentist on a routine basis  Eye doctor: She goes to the eye doctor for routine basis  Lab work: Pending labs today.  Colonoscopy: She is not due till 2024  Mammogram: She is due November of this year and GYN orders  Dr WYMAN does the BP med and her Hashimoto's.  She will take miralax every other day but her hemorrhoid is worse and she can now feel the  Irritation.   Depression: Not at risk   • PHQ-2 Score: 0       She is on the go and working at [employer] and she didn't know if that is causing the issues.    Allergies  Penicillins    Social History     Tobacco Use   • Smoking status: Never   • Smokeless tobacco: Never   Vaping Use   • Vaping Use: Never used   Substance Use Topics   • Alcohol use: Never     Comment: Rarely    • Drug use: Never       Family History   Problem Relation Age of Onset   • Heart disease Mother         Afib   • Hypertension Mother         Low blood pressure   • Rheum arthritis Mother    • Arthritis Mother    • Hearing loss Mother    • Thyroid disease Mother    • Heart disease Father    • Hypertension Father    • Cancer Father         Mylodysplasia and multiple myeloma   • Hearing loss Father    • Brain cancer Maternal Grandmother    • Diabetes type II Maternal Grandmother    • Heart disease Paternal Grandfather         Parkinsons   • Hypertension Paternal Grandfather    • Heart disease Other    • Heart disease Other    • Cancer Other         Unspecified   • Diabetes type II Other    • Thyroid disease Sister         Hyperthyroidism   • Malig Hyperthermia Neg Hx         Health Maintenance Due   Topic Date Due   • ANNUAL PHYSICAL  Never done   • ZOSTER VACCINE (2  "of 2) 10/23/2022        Immunization History   Administered Date(s) Administered   • COVID-19 (MODERNA) 1st,2nd,3rd Dose Monovalent 03/19/2021, 04/16/2021   • COVID-19 (MODERNA) Monovalent Original Booster 11/17/2021, 05/31/2022   • COVID-19 (PFIZER) BIVALENT 12+YRS 10/30/2022   • Flublok 18+yrs 10/12/2020, 10/28/2021   • Influenza, Unspecified 10/01/2020   • Tdap 12/02/2019       Review of Systems   Constitutional: Negative for fatigue.   Respiratory: Negative for cough and shortness of breath.    Cardiovascular: Negative for chest pain.   Gastrointestinal: Negative for diarrhea, nausea and vomiting.        Objective       Vitals:    05/18/23 0931   BP: 128/73   Pulse: 65   Resp: 18   Temp: 98.3 °F (36.8 °C)   SpO2: 99%   Weight: 72.9 kg (160 lb 11.2 oz)   Height: 157.5 cm (62\")       Body mass index is 29.39 kg/m².         Physical Exam  Vitals reviewed.   Constitutional:       Appearance: Normal appearance. She is well-developed.   HENT:      Right Ear: Tympanic membrane and ear canal normal.      Left Ear: Tympanic membrane and ear canal normal.      Nose: No congestion or rhinorrhea.      Mouth/Throat:      Pharynx: No posterior oropharyngeal erythema.   Eyes:      Conjunctiva/sclera: Conjunctivae normal.   Cardiovascular:      Rate and Rhythm: Normal rate and regular rhythm.      Heart sounds: Normal heart sounds. No murmur heard.  Pulmonary:      Effort: Pulmonary effort is normal.      Breath sounds: Normal breath sounds. No wheezing or rhonchi.   Abdominal:      General: Bowel sounds are normal. There is no distension.      Palpations: Abdomen is soft.      Tenderness: There is no abdominal tenderness.   Musculoskeletal:      Right lower leg: No edema.      Left lower leg: No edema.   Skin:     General: Skin is warm.      Findings: No bruising or rash.   Neurological:      Mental Status: She is alert and oriented to person, place, and time.      Cranial Nerves: No cranial nerve deficit.      Motor: No " weakness.   Psychiatric:         Mood and Affect: Mood and affect normal.             Result Review :     The following data was reviewed by: ESTHER Carrero on 05/18/2023:                     Assessment and Plan      Diagnoses and all orders for this visit:    1. Physical exam, annual (Primary)  -     Comprehensive Metabolic Panel  -     CBC & Differential  -     TSH  -     Lipid Panel            Follow Up     Return in about 1 year (around 5/18/2024) for Annual physical.  She will trial the hydrocortisone and if not better we will do nystatin.  She can do desitin and bacitracin daily if not using the steroid cream.  ADVICE WAS GIVEN RE:  ALCOHOL USE, SEATBELT USE, REGULAR EXERCISE, HEALTHY DIET, ROUTINE EYE AND DENTAL EXAMS  She will get her second shingles vaccine at [facility]  Patient was given instructions and counseling regarding her condition or for health maintenance advice. Please see specific information pulled into the AVS if appropriate.     Parts of this note are electronic transcriptions/translations of spoken language to printed text using the Dragon Dictation system.          ESTHER Carrero  05/18/2023

## 2023-08-11 ENCOUNTER — TRANSCRIBE ORDERS (OUTPATIENT)
Dept: LAB | Facility: HOSPITAL | Age: 53
End: 2023-08-11
Payer: COMMERCIAL

## 2023-08-11 ENCOUNTER — LAB (OUTPATIENT)
Dept: LAB | Facility: HOSPITAL | Age: 53
End: 2023-08-11
Payer: COMMERCIAL

## 2023-08-11 DIAGNOSIS — E03.9 HYPOTHYROIDISM, UNSPECIFIED TYPE: ICD-10-CM

## 2023-08-11 DIAGNOSIS — E03.9 HYPOTHYROIDISM, UNSPECIFIED TYPE: Primary | ICD-10-CM

## 2023-08-11 LAB
T3FREE SERPL-MCNC: 3.26 PG/ML (ref 2–4.4)
T4 FREE SERPL-MCNC: 1.28 NG/DL (ref 0.93–1.7)
TSH SERPL DL<=0.05 MIU/L-ACNC: 1.19 UIU/ML (ref 0.27–4.2)

## 2023-08-11 PROCEDURE — 36415 COLL VENOUS BLD VENIPUNCTURE: CPT

## 2023-08-11 PROCEDURE — 84481 FREE ASSAY (FT-3): CPT

## 2023-08-11 PROCEDURE — 84443 ASSAY THYROID STIM HORMONE: CPT

## 2023-08-11 PROCEDURE — 84439 ASSAY OF FREE THYROXINE: CPT

## 2023-10-02 ENCOUNTER — TRANSCRIBE ORDERS (OUTPATIENT)
Dept: ADMINISTRATIVE | Facility: HOSPITAL | Age: 53
End: 2023-10-02
Payer: COMMERCIAL

## 2023-10-02 DIAGNOSIS — Z12.31 VISIT FOR SCREENING MAMMOGRAM: Primary | ICD-10-CM

## 2023-10-19 ENCOUNTER — OFFICE VISIT (OUTPATIENT)
Dept: FAMILY MEDICINE CLINIC | Facility: CLINIC | Age: 53
End: 2023-10-19
Payer: COMMERCIAL

## 2023-10-19 VITALS
DIASTOLIC BLOOD PRESSURE: 70 MMHG | BODY MASS INDEX: 30.05 KG/M2 | TEMPERATURE: 92.2 F | SYSTOLIC BLOOD PRESSURE: 128 MMHG | RESPIRATION RATE: 16 BRPM | HEART RATE: 60 BPM | WEIGHT: 163.3 LBS | HEIGHT: 62 IN | OXYGEN SATURATION: 99 %

## 2023-10-19 DIAGNOSIS — M25.512 ACUTE PAIN OF LEFT SHOULDER: Primary | ICD-10-CM

## 2023-10-19 DIAGNOSIS — M25.612 DECREASED ROM OF LEFT SHOULDER: ICD-10-CM

## 2023-10-19 PROCEDURE — 99214 OFFICE O/P EST MOD 30 MIN: CPT | Performed by: NURSE PRACTITIONER

## 2023-10-19 RX ORDER — LEVOTHYROXINE SODIUM 0.03 MG/1
25 TABLET ORAL
COMMUNITY

## 2023-10-19 RX ORDER — PROGESTERONE 100 MG/1
CAPSULE ORAL
COMMUNITY
Start: 2023-08-28

## 2023-10-19 RX ORDER — TIZANIDINE 4 MG/1
4 TABLET ORAL NIGHTLY PRN
Qty: 20 TABLET | Refills: 0 | Status: SHIPPED | OUTPATIENT
Start: 2023-10-19

## 2023-10-19 RX ORDER — ESTRADIOL 1 MG/1
TABLET ORAL
COMMUNITY
Start: 2023-08-28

## 2023-10-19 NOTE — PROGRESS NOTES
Chief Complaint  Shoulder Pain    Subjective          Katy Dominguez presents to [facility]  History of Present Illness  She is here for left shoulder pain.  She said that she was working at [employer] which she does not working anymore.  She was putting stuff up on the shelves reached up on the left hand side and pushed the item up on the shelf and at that time she felt a pulling sensation.  She did not think anything about it and then over the last few weeks it is gotten worse.  This happened about 2 months ago.  She said that she had been alternating Tylenol and ibuprofen with minimal relief.  She has the pain down to the middle of the arm.  She cannot put her arm behind her back.  When she raises her left arm up she can get it just lower than the 90 degree and then has to take her right hand to raised up the rest.  She is also having the pain shoot from the neck over to the shoulder.  She is not able to pull covers up with her left hand/arm.    Depression: Not at risk (5/18/2023)    PHQ-2     PHQ-2 Score: 0    and 5/18/2023               Allergies  Penicillins    Social History     Tobacco Use    Smoking status: Never    Smokeless tobacco: Never   Vaping Use    Vaping Use: Never used   Substance Use Topics    Alcohol use: Never     Comment: Rarely     Drug use: Never       Family History   Problem Relation Age of Onset    Other Mother         SERVIN    Heart disease Mother         Afib    Hypertension Mother         Low blood pressure    Rheum arthritis Mother     Arthritis Mother     Hearing loss Mother     Thyroid disease Mother     Heart disease Father     Hypertension Father     Cancer Father         Mylodysplasia and multiple myeloma    Hearing loss Father     Thyroid disease Sister         Hyperthyroidism    Brain cancer Maternal Grandmother     Diabetes type II Maternal Grandmother     Heart disease Paternal Grandfather         Parkinsons    Hypertension Paternal  "Grandfather     Heart disease Other     Heart disease Other     Cancer Other         Unspecified    Diabetes type II Other     Malig Hyperthermia Neg Hx         Health Maintenance Due   Topic Date Due    ZOSTER VACCINE (2 of 2) 10/23/2022    PAP SMEAR  07/01/2023    COVID-19 Vaccine (6 - 2023-24 season) 09/01/2023        Immunization History   Administered Date(s) Administered    COVID-19 (MODERNA) 1st,2nd,3rd Dose Monovalent 03/19/2021, 04/16/2021    COVID-19 (MODERNA) Monovalent Original Booster 11/17/2021, 05/31/2022    COVID-19 (PFIZER) BIVALENT 12+YRS 10/30/2022    Flublok 18+yrs 10/12/2020, 10/28/2021, 10/03/2022, 10/12/2023    Influenza, Unspecified 10/01/2020    Shingrix 08/28/2022    Tdap 12/02/2019       Review of Systems     Objective       Vitals:    10/19/23 1302   BP: 128/70   Pulse: 60   Resp: 16   Temp: 92.2 °F (33.4 °C)   SpO2: 99%   Weight: 74.1 kg (163 lb 4.8 oz)   Height: 157.5 cm (62\")       Body mass index is 29.87 kg/m².         Physical Exam  Constitutional:       Appearance: Normal appearance.   HENT:      Head: Normocephalic.   Pulmonary:      Effort: Pulmonary effort is normal.   Musculoskeletal:      Left shoulder: Tenderness present. Decreased range of motion. Decreased strength.        Arms:       Comments: Unable to lift greater than 90 degrees without assistance from the right hand.  Can't rotate the arm to the back.   Skin:     Findings: No bruising.   Neurological:      General: No focal deficit present.      Mental Status: She is alert and oriented to person, place, and time.   Psychiatric:         Mood and Affect: Mood normal.         Behavior: Behavior normal.         Thought Content: Thought content normal.         Judgment: Judgment normal.             Result Review :     The following data was reviewed by: ESTHER Carrero on 10/19/2023:                     Assessment and Plan      Diagnoses and all orders for this visit:    1. Acute pain of left shoulder " (Primary)  -     Ambulatory Referral to Orthopedic Surgery  -     tiZANidine (ZANAFLEX) 4 MG tablet; Take 1 tablet by mouth At Night As Needed for Muscle Spasms.  Dispense: 20 tablet; Refill: 0  -     Diclofenac Sodium (Voltaren) 1 % gel gel; Apply 4 g topically to the appropriate area as directed 4 (Four) Times a Day As Needed (hand pain).  Dispense: 100 g; Refill: 1  -     diclofenac (VOLTAREN) 50 MG EC tablet; Take 1 tablet by mouth 2 (Two) Times a Day As Needed (pain).  Dispense: 60 tablet; Refill: 0    2. Decreased ROM of left shoulder  -     Ambulatory Referral to Orthopedic Surgery            Follow Up     No follow-ups on file.  We will do a referral to Ortho so that they can do an x-ray in the office and potentially give a steroid injection.  Discussed about the and impingement/entrapment along with frozen shoulder.  Discussed that physical therapy may also be a really good option.  We will place the referral as urgent as she is unable to move arm and she is trying to work which she works as a  currently.  She has lidocaine cream at home and she is aware to let that dry then add the diclofenac afterwards.  She may do the lidocaine every 2 hours and the diclofenac 4 times a day ointment wise.  She may take the diclofenac tablet if needed but no other NSAIDs with it.  She may take Tylenol.  Patient was given instructions and counseling regarding her condition or for health maintenance advice. Please see specific information pulled into the AVS if appropriate.     Parts of this note are electronic transcriptions/translations of spoken language to printed text using the Dragon Dictation system.          Gisella Velazquez, APRN  10/19/2023

## 2023-10-23 ENCOUNTER — OFFICE VISIT (OUTPATIENT)
Dept: ORTHOPEDIC SURGERY | Facility: CLINIC | Age: 53
End: 2023-10-23
Payer: COMMERCIAL

## 2023-10-23 VITALS
HEIGHT: 62 IN | SYSTOLIC BLOOD PRESSURE: 143 MMHG | WEIGHT: 163 LBS | DIASTOLIC BLOOD PRESSURE: 93 MMHG | HEART RATE: 85 BPM | BODY MASS INDEX: 30 KG/M2 | OXYGEN SATURATION: 98 %

## 2023-10-23 DIAGNOSIS — M75.02 ADHESIVE CAPSULITIS OF LEFT SHOULDER: ICD-10-CM

## 2023-10-23 DIAGNOSIS — M25.512 LEFT SHOULDER PAIN, UNSPECIFIED CHRONICITY: Primary | ICD-10-CM

## 2023-10-23 PROCEDURE — 99203 OFFICE O/P NEW LOW 30 MIN: CPT | Performed by: STUDENT IN AN ORGANIZED HEALTH CARE EDUCATION/TRAINING PROGRAM

## 2023-10-23 PROCEDURE — 20610 DRAIN/INJ JOINT/BURSA W/O US: CPT | Performed by: STUDENT IN AN ORGANIZED HEALTH CARE EDUCATION/TRAINING PROGRAM

## 2023-10-23 RX ORDER — TRIAMCINOLONE ACETONIDE 40 MG/ML
40 INJECTION, SUSPENSION INTRA-ARTICULAR; INTRAMUSCULAR
Status: COMPLETED | OUTPATIENT
Start: 2023-10-23 | End: 2023-10-23

## 2023-10-23 RX ORDER — LIDOCAINE HYDROCHLORIDE 10 MG/ML
5 INJECTION, SOLUTION INFILTRATION; PERINEURAL
Status: COMPLETED | OUTPATIENT
Start: 2023-10-23 | End: 2023-10-23

## 2023-10-23 RX ADMIN — LIDOCAINE HYDROCHLORIDE 5 ML: 10 INJECTION, SOLUTION INFILTRATION; PERINEURAL at 16:03

## 2023-10-23 RX ADMIN — TRIAMCINOLONE ACETONIDE 40 MG: 40 INJECTION, SUSPENSION INTRA-ARTICULAR; INTRAMUSCULAR at 16:03

## 2023-10-23 NOTE — PROGRESS NOTES
"Chief Complaint  Pain and Initial Evaluation of the Left Shoulder    Subjective          Katy Dominguez presents to [facility] for   History of Present Illness    The patient presents here today for evaluation of the left shoulder. The patient reports left shoulder pain that started 2 months ago when lifting flowers. She reports pain is worse at night. She reports decreased ROM to the shoulder. She has pain to the lateral shoulder.   Allergies   Allergen Reactions    Penicillins Unknown - Low Severity        Social History     Socioeconomic History    Marital status:    Tobacco Use    Smoking status: Never    Smokeless tobacco: Never   Vaping Use    Vaping Use: Never used   Substance and Sexual Activity    Alcohol use: Never     Comment: Rarely     Drug use: Never    Sexual activity: Yes     Partners: Male     Birth control/protection: Surgical        I reviewed the patient's chief complaint, history of present illness, review of systems, past medical history, surgical history, family history, social history, medications, and allergy list.     REVIEW OF SYSTEMS    Constitutional: Denies fevers, chills, weight loss  Cardiovascular: Denies chest pain, shortness of breath  Skin: Denies rashes, acute skin changes  Neurologic: Denies headache, loss of consciousness  MSK: left shoulder pain      Objective   Vital Signs:   /93 Comment: advised to see pcp  Pulse 85   Ht 157.5 cm (62\")   Wt 73.9 kg (163 lb)   SpO2 98%   BMI 29.81 kg/m²     Body mass index is 29.81 kg/m².    Physical Exam    General: Alert. No acute distress.   Left shoulder- skin intact. Forward elevation 100, passive 130 with firm end point. External Rotation 20. Internal rotation to back pocket. Sensation to light touch median, radial, ulnar nerve. Positive AIN, PIN, ulnar nerve motor function. Positive pulses. Abduction 60. 5/5 infraspinatus and supraspinatus strength. 5/5 subscapularis. Neurovascularly " intact.     Large Joint Arthrocentesis  Date/Time: 10/23/2023 4:03 PM  Consent given by: patient  Site marked: site marked  Timeout: Immediately prior to procedure a time out was called to verify the correct patient, procedure, equipment, support staff and site/side marked as required   Supporting Documentation  Indications: pain   Procedure Details  Location: shoulder (left) -   Needle gauge: 21 G.  Medications administered: 5 mL lidocaine 1 %; 40 mg triamcinolone acetonide 40 MG/ML  Patient tolerance: patient tolerated the procedure well with no immediate complications        Imaging Results (Most Recent)       Procedure Component Value Units Date/Time    XR Shoulder 2+ View Left [[id number]] Resulted: 10/23/23 1709     Updated: 10/23/23 1710    Narrative:      Indications: Left shoulder pain    Views: AP, Grashey, Scap Y, axillary left shoulder    Findings: No fractures noted.  No degenerative changes seen.  Glenohumeral   joint reduced.    Comparative Data: No comparative data available                     Assessment and Plan        XR Shoulder 2+ View Left    Result Date: 10/23/2023  Narrative: Indications: Left shoulder pain Views: AP, Grashey, Scap Y, axillary left shoulder Findings: No fractures noted.  No degenerative changes seen.  Glenohumeral joint reduced. Comparative Data: No comparative data available      Diagnoses and all orders for this visit:    1. Left shoulder pain, unspecified chronicity (Primary)  -     XR Shoulder 2+ View Left    2. Adhesive capsulitis of left shoulder  -     Ambulatory Referral to Physical Therapy Evaluate and treat, Ortho; Stretching, ROM    Other orders  -     Large Joint Arthrocentesis        Discussed the treatment plan with the patient.  I reviewed the x-rays that were obtained today with the patient. Plan for conservative treatment at this time. Discussed the risks and benefits of a left shoulder steroid injection. The patient expressed understanding and wished to  proceed. She tolerated the injection well. Plan to continue Voltaren. Order for physical therapy given today.       Call or return if worsening symptoms.    Scribed for Tong Lane MD by Mikala Alcantara  10/23/2023   15:54 EDT         Follow Up       6-8 weeks    Patient was given instructions and counseling regarding her condition or for health maintenance advice. Please see specific information pulled into the AVS if appropriate.       I have personally performed the services described in this document as scribed by the above individual and it is both accurate and complete.     Tong Lane MD  10/23/23  17:21 EDT

## 2023-10-25 ENCOUNTER — PATIENT ROUNDING (BHMG ONLY) (OUTPATIENT)
Dept: ORTHOPEDIC SURGERY | Facility: CLINIC | Age: 53
End: 2023-10-25
Payer: COMMERCIAL

## 2023-10-25 NOTE — PROGRESS NOTES
A [employer]uke message has been sent to the patient for PATIENT ROUNDING with [facility].

## 2023-12-11 ENCOUNTER — HOSPITAL ENCOUNTER (OUTPATIENT)
Dept: MAMMOGRAPHY | Facility: HOSPITAL | Age: 53
Discharge: HOME | End: 2023-12-11
Admitting: OBSTETRICS & GYNECOLOGY
Payer: COMMERCIAL

## 2023-12-11 DIAGNOSIS — Z12.31 VISIT FOR SCREENING MAMMOGRAM: ICD-10-CM

## 2023-12-11 PROCEDURE — 77063 BREAST TOMOSYNTHESIS BI: CPT

## 2023-12-11 PROCEDURE — 77067 SCR MAMMO BI INCL CAD: CPT

## 2024-02-27 RX ORDER — LOSARTAN POTASSIUM 50 MG/1
50 TABLET ORAL DAILY
Qty: 30 TABLET | Refills: 2 | Status: SHIPPED | OUTPATIENT
Start: 2024-02-27

## 2024-03-15 RX ORDER — LEVOTHYROXINE SODIUM 0.03 MG/1
25 TABLET ORAL
Qty: 30 TABLET | Refills: 1 | Status: SHIPPED | OUTPATIENT
Start: 2024-03-15

## 2024-05-13 RX ORDER — LEVOTHYROXINE SODIUM 25 MCG
25 TABLET ORAL EVERY MORNING
Qty: 30 TABLET | Refills: 0 | Status: SHIPPED | OUTPATIENT
Start: 2024-05-13

## 2024-05-21 ENCOUNTER — OFFICE VISIT (OUTPATIENT)
Dept: FAMILY MEDICINE CLINIC | Facility: CLINIC | Age: 54
End: 2024-05-21
Payer: COMMERCIAL

## 2024-05-21 VITALS
TEMPERATURE: 98 F | SYSTOLIC BLOOD PRESSURE: 120 MMHG | RESPIRATION RATE: 18 BRPM | HEIGHT: 62 IN | BODY MASS INDEX: 31.04 KG/M2 | DIASTOLIC BLOOD PRESSURE: 70 MMHG | WEIGHT: 168.7 LBS | HEART RATE: 68 BPM | OXYGEN SATURATION: 98 %

## 2024-05-21 DIAGNOSIS — K30 INDIGESTION: ICD-10-CM

## 2024-05-21 DIAGNOSIS — R23.2 HOT FLASHES: ICD-10-CM

## 2024-05-21 DIAGNOSIS — Z86.010 HISTORY OF COLON POLYPS: ICD-10-CM

## 2024-05-21 DIAGNOSIS — Z00.00 ANNUAL PHYSICAL EXAM: Primary | ICD-10-CM

## 2024-05-21 DIAGNOSIS — E03.9 ACQUIRED HYPOTHYROIDISM: ICD-10-CM

## 2024-05-21 DIAGNOSIS — I10 PRIMARY HYPERTENSION: ICD-10-CM

## 2024-05-21 DIAGNOSIS — K59.09 OTHER CONSTIPATION: ICD-10-CM

## 2024-05-21 DIAGNOSIS — M25.512 CHRONIC LEFT SHOULDER PAIN: ICD-10-CM

## 2024-05-21 DIAGNOSIS — G89.29 CHRONIC LEFT SHOULDER PAIN: ICD-10-CM

## 2024-05-21 LAB
ALBUMIN SERPL-MCNC: 4.2 G/DL (ref 3.5–5.2)
ALBUMIN/GLOB SERPL: 1.5 G/DL
ALP SERPL-CCNC: 79 U/L (ref 39–117)
ALT SERPL W P-5'-P-CCNC: 16 U/L (ref 1–33)
ANION GAP SERPL CALCULATED.3IONS-SCNC: 11.2 MMOL/L (ref 5–15)
AST SERPL-CCNC: 24 U/L (ref 1–32)
BASOPHILS # BLD AUTO: 0.06 10*3/MM3 (ref 0–0.2)
BASOPHILS NFR BLD AUTO: 0.9 % (ref 0–1.5)
BILIRUB SERPL-MCNC: 0.6 MG/DL (ref 0–1.2)
BUN SERPL-MCNC: 11 MG/DL (ref 6–20)
BUN/CREAT SERPL: 13.4 (ref 7–25)
CALCIUM SPEC-SCNC: 9.7 MG/DL (ref 8.6–10.5)
CHLORIDE SERPL-SCNC: 105 MMOL/L (ref 98–107)
CHOLEST SERPL-MCNC: 188 MG/DL (ref 0–200)
CO2 SERPL-SCNC: 26.8 MMOL/L (ref 22–29)
CREAT SERPL-MCNC: 0.82 MG/DL (ref 0.57–1)
DEPRECATED RDW RBC AUTO: 37.9 FL (ref 37–54)
EGFRCR SERPLBLD CKD-EPI 2021: 84.1 ML/MIN/1.73
EOSINOPHIL # BLD AUTO: 0.16 10*3/MM3 (ref 0–0.4)
EOSINOPHIL NFR BLD AUTO: 2.5 % (ref 0.3–6.2)
ERYTHROCYTE [DISTWIDTH] IN BLOOD BY AUTOMATED COUNT: 11.7 % (ref 12.3–15.4)
FSH SERPL-ACNC: 62.8 MIU/ML
GLOBULIN UR ELPH-MCNC: 2.8 GM/DL
GLUCOSE SERPL-MCNC: 89 MG/DL (ref 65–99)
HCT VFR BLD AUTO: 42.8 % (ref 34–46.6)
HDLC SERPL-MCNC: 66 MG/DL (ref 40–60)
HGB BLD-MCNC: 14.3 G/DL (ref 12–15.9)
IMM GRANULOCYTES # BLD AUTO: 0.01 10*3/MM3 (ref 0–0.05)
IMM GRANULOCYTES NFR BLD AUTO: 0.2 % (ref 0–0.5)
LDLC SERPL CALC-MCNC: 111 MG/DL (ref 0–100)
LDLC/HDLC SERPL: 1.67 {RATIO}
LH SERPL-ACNC: 51.7 MIU/ML
LYMPHOCYTES # BLD AUTO: 2.31 10*3/MM3 (ref 0.7–3.1)
LYMPHOCYTES NFR BLD AUTO: 36 % (ref 19.6–45.3)
MCH RBC QN AUTO: 30.2 PG (ref 26.6–33)
MCHC RBC AUTO-ENTMCNC: 33.4 G/DL (ref 31.5–35.7)
MCV RBC AUTO: 90.3 FL (ref 79–97)
MONOCYTES # BLD AUTO: 0.42 10*3/MM3 (ref 0.1–0.9)
MONOCYTES NFR BLD AUTO: 6.6 % (ref 5–12)
NEUTROPHILS NFR BLD AUTO: 3.45 10*3/MM3 (ref 1.7–7)
NEUTROPHILS NFR BLD AUTO: 53.8 % (ref 42.7–76)
NRBC BLD AUTO-RTO: 0 /100 WBC (ref 0–0.2)
PLATELET # BLD AUTO: 197 10*3/MM3 (ref 140–450)
PMV BLD AUTO: 12.1 FL (ref 6–12)
POTASSIUM SERPL-SCNC: 4.1 MMOL/L (ref 3.5–5.2)
PROT SERPL-MCNC: 7 G/DL (ref 6–8.5)
RBC # BLD AUTO: 4.74 10*6/MM3 (ref 3.77–5.28)
SODIUM SERPL-SCNC: 143 MMOL/L (ref 136–145)
T4 FREE SERPL-MCNC: 1.16 NG/DL (ref 0.93–1.7)
TRIGL SERPL-MCNC: 60 MG/DL (ref 0–150)
TSH SERPL DL<=0.05 MIU/L-ACNC: 2.24 UIU/ML (ref 0.27–4.2)
VLDLC SERPL-MCNC: 11 MG/DL (ref 5–40)
WBC NRBC COR # BLD AUTO: 6.41 10*3/MM3 (ref 3.4–10.8)

## 2024-05-21 PROCEDURE — 99214 OFFICE O/P EST MOD 30 MIN: CPT | Performed by: NURSE PRACTITIONER

## 2024-05-21 PROCEDURE — 83002 ASSAY OF GONADOTROPIN (LH): CPT | Performed by: NURSE PRACTITIONER

## 2024-05-21 PROCEDURE — 99396 PREV VISIT EST AGE 40-64: CPT | Performed by: NURSE PRACTITIONER

## 2024-05-21 PROCEDURE — 80061 LIPID PANEL: CPT | Performed by: NURSE PRACTITIONER

## 2024-05-21 PROCEDURE — 80050 GENERAL HEALTH PANEL: CPT | Performed by: NURSE PRACTITIONER

## 2024-05-21 PROCEDURE — 84439 ASSAY OF FREE THYROXINE: CPT | Performed by: NURSE PRACTITIONER

## 2024-05-21 PROCEDURE — 83001 ASSAY OF GONADOTROPIN (FSH): CPT | Performed by: NURSE PRACTITIONER

## 2024-05-21 RX ORDER — LOSARTAN POTASSIUM 50 MG/1
50 TABLET ORAL DAILY
Qty: 90 TABLET | Refills: 3 | Status: SHIPPED | OUTPATIENT
Start: 2024-05-21

## 2024-05-21 RX ORDER — PANTOPRAZOLE SODIUM 40 MG/1
40 TABLET, DELAYED RELEASE ORAL DAILY
Qty: 14 TABLET | Refills: 0 | Status: SHIPPED | OUTPATIENT
Start: 2024-05-21 | End: 2024-06-04

## 2024-05-21 RX ORDER — LEVOTHYROXINE SODIUM 0.03 MG/1
25 TABLET ORAL EVERY MORNING
Qty: 90 TABLET | Refills: 3 | Status: SHIPPED | OUTPATIENT
Start: 2024-05-21

## 2024-05-21 NOTE — PROGRESS NOTES
Chief Complaint  Hypertension and Hypothyroidism    Subjective          Katy Karis Dominguez presents to [facility]  History of Present Illness  Here for check up:  Dentist: Goes routinely  Eye: Yearly  Mammo: December 2023  Pap: Due October 2026  Family hx: No changes noted  Dexa: Unsure if she is postmenopausal due to having the ablation.  GYN had her on hormones but then she decided to come off of it she has had some hot flashes here and there and some constipation but that has balanced out.  She has been off the hormone since about the first week of April.  Colonoscopy: Due December 24    Thyroid: She is taking her thyroid medicine.  She needs refills today.  Htn: She is currently not working as she is retired and she will go back but she does not want to currently.  She is doing very well with the losartan.  No cough or chest pain.  Joint Pain: She is doing okay with her joints.  Constipation: She does not feel that she has ever been taking.  She does have history of polyps and has not had her colonoscopy this year.  She has been increasing her prunes and her liquids.  She did have to take a Dulcolax laxative last week which did help.  She is just noticed more irritability with her colon recently.  Depression: Not at risk (5/21/2024)    PHQ-2     PHQ-2 Score: 0    and 5/21/2024               Allergies  Penicillins    Social History     Tobacco Use    Smoking status: Never    Smokeless tobacco: Never   Vaping Use    Vaping status: Never Used   Substance Use Topics    Alcohol use: Not Currently     Comment: None    Drug use: Never       Family History   Problem Relation Age of Onset    Other Mother         SERVIN    Heart disease Mother         Afib    Hypertension Mother         Low blood pressure    Rheum arthritis Mother     Arthritis Mother     Hearing loss Mother     Thyroid disease Mother     Heart disease Father     Hypertension Father     Cancer Father         Mylodysplasia  "and multiple myeloma    Hearing loss Father     Thyroid disease Sister         Hyperthyroidism    Brain cancer Maternal Grandmother     Diabetes type II Maternal Grandmother     Heart disease Paternal Grandfather         Parkinsons    Hypertension Paternal Grandfather     Heart disease Other     Heart disease Other     Cancer Other         Unspecified    Diabetes type II Other     Malig Hyperthermia Neg Hx         Health Maintenance Due   Topic Date Due    ANNUAL PHYSICAL  05/18/2024        Immunization History   Administered Date(s) Administered    COVID-19 (MODERNA) 1st,2nd,3rd Dose Monovalent 03/19/2021, 04/16/2021    COVID-19 (MODERNA) Monovalent Original Booster 11/17/2021, 05/31/2022    COVID-19 (PFIZER) BIVALENT 12+YRS 10/30/2022    COVID-19 F23 (PFIZER) 12YRS+ (COMIRNATY) 10/21/2023    Flublok 18+yrs 10/12/2020, 10/28/2021, 10/03/2022, 10/03/2023    Influenza, Unspecified 10/01/2020    Shingrix 05/31/2022, 08/28/2022    Tdap 12/02/2019       Review of Systems   Eyes:  Negative for blurred vision.   Respiratory:  Negative for shortness of breath.    Cardiovascular:  Negative for chest pain and palpitations.        Objective       Vitals:    05/21/24 0852 05/21/24 0900   BP: 148/77 120/70   Pulse: 68    Resp: 18    Temp: 98 °F (36.7 °C)    SpO2: 98%    Weight: 76.5 kg (168 lb 11.2 oz)    Height: 157.5 cm (62\")        Body mass index is 30.86 kg/m².         Physical Exam  Vitals and nursing note reviewed.   Constitutional:       General: She is not in acute distress.     Appearance: Normal appearance. She is not ill-appearing.   HENT:      Right Ear: Tympanic membrane and ear canal normal.      Left Ear: Tympanic membrane and ear canal normal.      Nose: No congestion or rhinorrhea.      Mouth/Throat:      Pharynx: No oropharyngeal exudate or posterior oropharyngeal erythema.   Eyes:      Conjunctiva/sclera: Conjunctivae normal.   Neck:      Vascular: No carotid bruit.   Cardiovascular:      Rate and Rhythm: " Normal rate and regular rhythm.      Heart sounds: No murmur heard.  Pulmonary:      Effort: Pulmonary effort is normal.      Breath sounds: Normal breath sounds. No wheezing or rhonchi.   Abdominal:      General: Bowel sounds are normal.      Palpations: Abdomen is soft.   Musculoskeletal:      Cervical back: No tenderness.      Right lower leg: No edema.      Left lower leg: No edema.   Skin:     Findings: No bruising or rash.   Neurological:      General: No focal deficit present.      Mental Status: She is alert and oriented to person, place, and time. Mental status is at baseline.   Psychiatric:         Mood and Affect: Mood normal.         Behavior: Behavior normal.         Thought Content: Thought content normal.         Judgment: Judgment normal.               Result Review :     The following data was reviewed by: ESTHER Carrero on 05/21/2024:    Common Labs   Common labs          5/21/2024    09:32   Common Labs   Glucose 89    BUN 11    Creatinine 0.82    Sodium 143    Potassium 4.1    Chloride 105    Calcium 9.7    Albumin 4.2    Total Bilirubin 0.6    Alkaline Phosphatase 79    AST (SGOT) 24    ALT (SGPT) 16    WBC 6.41    Hemoglobin 14.3    Hematocrit 42.8    Platelets 197    Total Cholesterol 188    Triglycerides 60    HDL Cholesterol 66    LDL Cholesterol  111            No Images in the past 120 days found..              Assessment and Plan      Assessment & Plan  Annual physical exam  ADVICE WAS GIVEN RE:  ALCOHOL USE, SEATBELT USE, REGULAR EXERCISE, HEALTHY DIET, ROUTINE EYE AND DENTAL EXAMS    Indigestion  She has been having an increase in indigestion.  She has been eating late at night.  She will get pressure and discomfort and just like a burning sensation in her chest.  She does not really like to take Tums as she does not like the taste of she will have feeling like she may need to vomit but she does not.  Primary hypertension   continue on the losartan  Acquired  hypothyroidism  Continue on Synthroid  Hot flashes  Discussed that if we can stay off hormones that would be better.  We will check to see if she is perimenopausal or if she is in the menopausal state with FSH.  Other constipation    History of colon polyps  We will refer to GI with her history.  Chronic left shoulder pain  We did briefly discuss about the shoulder x-rays.  If she wishes we can do an order for physical therapy she is going to try some home exercises first.  She has a history of a frozen shoulder and she did get an injection which did help over time but now is back to the physician where she cannot raise her arm over a 90 degree angle.    Orders Placed This Encounter   Procedures    Comprehensive Metabolic Panel    TSH    Lipid Panel    T4, Free    FSH & LH    CBC Auto Differential    Ambulatory Referral to Gastroenterology    CBC & Differential     New Medications Ordered This Visit   Medications    levothyroxine (Synthroid) 25 MCG tablet     Sig: Take 1 tablet by mouth Every Morning.     Dispense:  90 tablet     Refill:  3    losartan (COZAAR) 50 MG tablet     Sig: Take 1 tablet by mouth Daily.     Dispense:  90 tablet     Refill:  3    pantoprazole (Protonix) 40 MG EC tablet     Sig: Take 1 tablet by mouth Daily for 14 days.     Dispense:  14 tablet     Refill:  0          Diagnosis Plan   1. Annual physical exam  Comprehensive Metabolic Panel    CBC & Differential    TSH    Lipid Panel      2. Indigestion  pantoprazole (Protonix) 40 MG EC tablet    Ambulatory Referral to Gastroenterology      3. Primary hypertension  losartan (COZAAR) 50 MG tablet      4. Acquired hypothyroidism  levothyroxine (Synthroid) 25 MCG tablet    T4, Free      5. Hot flashes  FSH & LH      6. Other constipation  Ambulatory Referral to Gastroenterology      7. History of colon polyps  Ambulatory Referral to Gastroenterology      8. Chronic left shoulder pain                  Follow Up     Return in about 1 year (around  5/21/2025) for Annual physical.    Patient was given instructions and counseling regarding her condition or for health maintenance advice. Please see specific information pulled into the AVS if appropriate.     Parts of this note are electronic transcriptions/translations of spoken language to printed text using the Dragon Dictation system.          Gisella Velazquez, ESTHER  05/21/2024  Answers submitted by the patient for this visit:  Primary Reason for Visit (Submitted on 5/14/2024)  What is the primary reason for your visit?: High Blood Pressure

## 2024-05-24 ENCOUNTER — PATIENT ROUNDING (BHMG ONLY) (OUTPATIENT)
Dept: FAMILY MEDICINE CLINIC | Facility: CLINIC | Age: 54
End: 2024-05-24
Payer: COMMERCIAL

## 2024-05-24 NOTE — PROGRESS NOTES
A My-Chart message has been sent to the patient for PATIENT ROUNDING with [facility].

## 2024-06-11 DIAGNOSIS — E03.9 ACQUIRED HYPOTHYROIDISM: ICD-10-CM

## 2024-06-11 RX ORDER — LEVOTHYROXINE SODIUM 25 MCG
25 TABLET ORAL EVERY MORNING
Qty: 30 TABLET | Refills: 11 | Status: SHIPPED | OUTPATIENT
Start: 2024-06-11

## 2024-09-04 ENCOUNTER — TRANSCRIBE ORDERS (OUTPATIENT)
Dept: ADMINISTRATIVE | Facility: HOSPITAL | Age: 54
End: 2024-09-04
Payer: COMMERCIAL

## 2024-09-04 DIAGNOSIS — Z12.31 VISIT FOR SCREENING MAMMOGRAM: Primary | ICD-10-CM

## 2024-10-22 NOTE — PROGRESS NOTES
Chief Complaint        Constipation (Daily with miralax, without miralax it can be 4-5 days small amounts. ), Abdominal Pain (Lower right side abdominal pain, sharp stabbing pain, never feels like she is empty. Feels really full when she eats. Indigestion occurs. )    History of Present Illness      Katy Dominguez is a 54 y.o. female who presents to [facility] as a new patient with a personal history of colon polyps, ongoing constipation, right-sided abdominal pain and occasional reflux.  Patient reports that she has a history of colon polyps and was told that she would be due for repeat colonoscopy in 3 years due to history of colon polyps.  Patient reports her last colonoscopy in 2021 displayed no colon polyps.  Patient reports bowel movements are constipated which is consistent with where her bowel movements have always been.  She uses daily MiraLAX with good control.  Patient reports very occasional right lower quadrant abdominal pain that occurs about every 6 weeks or more that feels like a stabbing pain that will last less than a day and resolve on its own.  She cannot correlate if this pain is related to bowel movements.  She denies melena or hematochezia.  Patient denies fever, nausea, vomiting, weight loss, night sweats, melena, hematochezia, hematemesis.  Patient has not had previous CT scan.    Most recent labs- 5/21/24    Colonoscopy: Review of the patient's most recent colonoscopy performed by Dr. Gaines on 12/14/21 normal colonoscopy repeat in 3 years.  Nonbleeding internal hemorrhoids.    Endoscopy: Review of the patient's most recent EGD and colonoscopy performed by Dr. Orlando on 10/25/19 Normal esophagus diffuse continuous erythema in the antrum 2 pedunculated 8 mm polyps were found in the transverse colon    Results       Result Review :   The following data was reviewed by: ESTHER Cote on 10/24/2024     CMP          5/21/2024    09:32   CMP   Glucose  "89    BUN 11    Creatinine 0.82    EGFR 84.1    Sodium 143    Potassium 4.1    Chloride 105    Calcium 9.7    Total Protein 7.0    Albumin 4.2    Globulin 2.8    Total Bilirubin 0.6    Alkaline Phosphatase 79    AST (SGOT) 24    ALT (SGPT) 16    Albumin/Globulin Ratio 1.5    BUN/Creatinine Ratio 13.4    Anion Gap 11.2      CBC          5/21/2024    09:32   CBC   WBC 6.41    RBC 4.74    Hemoglobin 14.3    Hematocrit 42.8    MCV 90.3    MCH 30.2    MCHC 33.4    RDW 11.7    Platelets 197        Iron Profile No results found for: \"IRON\", \"TIBC\", \"LABIRON\", \"TRANSFERRIN\"  Ferritin No results found for: \"FERRITIN\"         Past Medical History       Past Medical History:   Diagnosis Date    Colon polyp     COVID-19 06/2022    GERD (gastroesophageal reflux disease) 2019    History of Occasional reflux.  It has been worse in the past few months    Hashimoto's thyroiditis     Headache 1972    HTN (hypertension)     Hyperthyroidism July 2022    Hashimotos    PONV (postoperative nausea and vomiting)        Past Surgical History:   Procedure Laterality Date    CHOLECYSTECTOMY      COLONOSCOPY  10/25/2019    Polyps 8 mm in the distal transverse colon, Polyp 5mm in the cecum    COLONOSCOPY N/A 12/14/2021    Procedure: COLONOSCOPY;  Surgeon: Duncan Gaines MD;  Location: [facility] ENDOSCOPY;  Service: General;  Laterality: N/A;  hemmorroids and skin tags    ENDOMETRIAL ABLATION      ENDOSCOPY  10/25/2019    Normal esophagus, Diffuse continuous erythema of the mucosa was noted in the antrum, Multiple cold forceps biopsies were performed for histology, Normal duodenum    GALLBLADDER SURGERY      UPPER GASTROINTESTINAL ENDOSCOPY  2019?         Current Outpatient Medications:     aspirin 81 MG chewable tablet, Chew 1 tablet Daily., Disp: , Rfl:     estradiol (ESTRACE) 0.1 MG/GM vaginal cream, , Disp: , Rfl:     estradiol (ESTRACE) 1 MG tablet, , Disp: , Rfl:     levothyroxine (Synthroid) 25 MCG tablet, TAKE 1 TABLET BY MOUTH EVERY " "MORNING, Disp: 30 tablet, Rfl: 11    losartan (COZAAR) 50 MG tablet, Take 1 tablet by mouth Daily., Disp: 90 tablet, Rfl: 3    Progesterone (PROMETRIUM) 100 MG capsule, , Disp: , Rfl:     valACYclovir (VALTREX) 500 MG tablet, Take 1 tablet by mouth 2 (Two) Times a Day., Disp: 60 tablet, Rfl: 5     Allergies   Allergen Reactions    Penicillins Unknown - Low Severity       Family History   Problem Relation Age of Onset    Heart disease Mother         Afib    Hypertension Mother         Low blood pressure    Rheum arthritis Mother     Arthritis Mother     Hearing loss Mother     Thyroid disease Mother     Heart disease Father     Hypertension Father     Cancer Father         Mylodysplasia and multiple myeloma    Hearing loss Father     Colon polyps Sister     Thyroid disease Sister         Hyperthyroidism    Colon polyps Brother     Colon polyps Brother     Colon cancer Paternal Aunt     Brain cancer Maternal Grandmother     Diabetes type II Maternal Grandmother     Heart disease Paternal Grandfather         Parkinsons    Hypertension Paternal Grandfather     Heart disease Other     Heart disease Other     Cancer Other         Unspecified    Diabetes type II Other     Malig Hyperthermia Neg Hx         Social History     Social History Narrative    Not on file       Objective       Objective     Vital Signs:   /70 (BP Location: Right arm, Patient Position: Sitting, Cuff Size: Adult)   Pulse 64   Ht 157.5 cm (62\")   Wt 78.4 kg (172 lb 14.4 oz)   SpO2 100%   BMI 31.62 kg/m²     Body mass index is 31.62 kg/m².    Physical Exam  Constitutional:       Appearance: Normal appearance.   Pulmonary:      Effort: Pulmonary effort is normal.   Neurological:      General: No focal deficit present.      Mental Status: She is alert and oriented to person, place, and time.   Psychiatric:         Mood and Affect: Mood normal.         Behavior: Behavior normal.              Assessment & Plan          Assessment and Plan  "   Diagnoses and all orders for this visit:    1. Right lower quadrant abdominal pain (Primary)    2. Polyp of colon, unspecified part of colon, unspecified type    3. History of colonic polyps      54-year-old female presenting the office today as a new patient with a personal history of colon polyps, ongoing constipation, right-sided abdominal pain and occasional reflux.  I have recommended that the patient undergo further evaluation with a colonoscopy.  I have discussed this procedure in detail with the patient.  I have discussed the risks, benefits and alternatives.  I have discussed the risk of anesthesia, bleeding and perforation.  Patient understands these risks, benefits and alternatives and wishes to defer at this time.  Have discussed option of CT scan of abdomen pelvis with contrast to the right lower quadrant abdominal pain patient wishes to defer at this time.  Patient will follow-up in 1 year and call if she wishes to move forward with colonoscopy or CT scan.  Patient agreeable to this plan.            Follow Up       Follow Up   Return in about 1 year (around 10/24/2025).  Patient was given instructions and counseling regarding her condition or for health maintenance advice. Please see specific information pulled into the AVS if appropriate.

## 2024-10-24 ENCOUNTER — OFFICE VISIT (OUTPATIENT)
Dept: GASTROENTEROLOGY | Facility: CLINIC | Age: 54
End: 2024-10-24
Payer: COMMERCIAL

## 2024-10-24 VITALS
SYSTOLIC BLOOD PRESSURE: 138 MMHG | HEART RATE: 64 BPM | BODY MASS INDEX: 31.82 KG/M2 | HEIGHT: 62 IN | DIASTOLIC BLOOD PRESSURE: 70 MMHG | OXYGEN SATURATION: 100 % | WEIGHT: 172.9 LBS

## 2024-10-24 DIAGNOSIS — Z86.0100 HISTORY OF COLONIC POLYPS: ICD-10-CM

## 2024-10-24 DIAGNOSIS — R10.31 RIGHT LOWER QUADRANT ABDOMINAL PAIN: Primary | ICD-10-CM

## 2024-10-24 DIAGNOSIS — K63.5 POLYP OF COLON, UNSPECIFIED PART OF COLON, UNSPECIFIED TYPE: ICD-10-CM

## 2024-10-24 RX ORDER — ASPIRIN 81 MG/1
81 TABLET, CHEWABLE ORAL DAILY
COMMUNITY

## 2024-10-24 RX ORDER — PROGESTERONE 100 MG/1
CAPSULE ORAL
COMMUNITY
Start: 2024-09-16

## 2024-10-24 RX ORDER — ESTRADIOL 1 MG/1
TABLET ORAL
COMMUNITY
Start: 2024-09-16

## 2024-10-24 RX ORDER — ESTRADIOL 0.1 MG/G
CREAM VAGINAL
COMMUNITY
Start: 2024-09-13

## 2024-12-21 ENCOUNTER — HOSPITAL ENCOUNTER (OUTPATIENT)
Dept: MAMMOGRAPHY | Facility: HOSPITAL | Age: 54
Discharge: HOME | End: 2024-12-21
Admitting: OBSTETRICS & GYNECOLOGY
Payer: COMMERCIAL

## 2024-12-21 DIAGNOSIS — Z12.31 VISIT FOR SCREENING MAMMOGRAM: ICD-10-CM

## 2024-12-21 PROCEDURE — 77063 BREAST TOMOSYNTHESIS BI: CPT

## 2024-12-21 PROCEDURE — 77067 SCR MAMMO BI INCL CAD: CPT

## 2025-05-16 DIAGNOSIS — I10 PRIMARY HYPERTENSION: ICD-10-CM

## 2025-05-16 RX ORDER — LOSARTAN POTASSIUM 50 MG/1
50 TABLET ORAL DAILY
Qty: 30 TABLET | Refills: 0 | Status: SHIPPED | OUTPATIENT
Start: 2025-05-16

## 2025-05-21 ENCOUNTER — OFFICE VISIT (OUTPATIENT)
Dept: FAMILY MEDICINE CLINIC | Facility: CLINIC | Age: 55
End: 2025-05-21
Payer: COMMERCIAL

## 2025-05-21 VITALS
WEIGHT: 171.8 LBS | OXYGEN SATURATION: 97 % | SYSTOLIC BLOOD PRESSURE: 125 MMHG | BODY MASS INDEX: 31.62 KG/M2 | RESPIRATION RATE: 18 BRPM | TEMPERATURE: 97.6 F | HEIGHT: 62 IN | DIASTOLIC BLOOD PRESSURE: 83 MMHG | HEART RATE: 73 BPM

## 2025-05-21 DIAGNOSIS — I10 PRIMARY HYPERTENSION: ICD-10-CM

## 2025-05-21 DIAGNOSIS — Z00.00 ANNUAL PHYSICAL EXAM: Primary | ICD-10-CM

## 2025-05-21 DIAGNOSIS — E03.9 ACQUIRED HYPOTHYROIDISM: ICD-10-CM

## 2025-05-21 LAB
ALBUMIN SERPL-MCNC: 4.3 G/DL (ref 3.5–5.2)
ALBUMIN/GLOB SERPL: 1.5 G/DL
ALP SERPL-CCNC: 79 U/L (ref 39–117)
ALT SERPL W P-5'-P-CCNC: 15 U/L (ref 1–33)
ANION GAP SERPL CALCULATED.3IONS-SCNC: 10 MMOL/L (ref 5–15)
AST SERPL-CCNC: 23 U/L (ref 1–32)
BASOPHILS # BLD AUTO: 0.05 10*3/MM3 (ref 0–0.2)
BASOPHILS NFR BLD AUTO: 0.8 % (ref 0–1.5)
BILIRUB SERPL-MCNC: 0.5 MG/DL (ref 0–1.2)
BUN SERPL-MCNC: 11 MG/DL (ref 6–20)
BUN/CREAT SERPL: 14.3 (ref 7–25)
CALCIUM SPEC-SCNC: 9.3 MG/DL (ref 8.6–10.5)
CHLORIDE SERPL-SCNC: 105 MMOL/L (ref 98–107)
CHOLEST SERPL-MCNC: 184 MG/DL (ref 0–200)
CO2 SERPL-SCNC: 25 MMOL/L (ref 22–29)
CREAT SERPL-MCNC: 0.77 MG/DL (ref 0.57–1)
DEPRECATED RDW RBC AUTO: 42.4 FL (ref 37–54)
EGFRCR SERPLBLD CKD-EPI 2021: 90.1 ML/MIN/1.73
EOSINOPHIL # BLD AUTO: 0.19 10*3/MM3 (ref 0–0.4)
EOSINOPHIL NFR BLD AUTO: 2.9 % (ref 0.3–6.2)
ERYTHROCYTE [DISTWIDTH] IN BLOOD BY AUTOMATED COUNT: 12.2 % (ref 12.3–15.4)
GLOBULIN UR ELPH-MCNC: 2.8 GM/DL
GLUCOSE SERPL-MCNC: 82 MG/DL (ref 65–99)
HCT VFR BLD AUTO: 43.8 % (ref 34–46.6)
HDLC SERPL-MCNC: 61 MG/DL (ref 40–60)
HGB BLD-MCNC: 14.4 G/DL (ref 12–15.9)
IMM GRANULOCYTES # BLD AUTO: 0.02 10*3/MM3 (ref 0–0.05)
IMM GRANULOCYTES NFR BLD AUTO: 0.3 % (ref 0–0.5)
LDLC SERPL CALC-MCNC: 112 MG/DL (ref 0–100)
LDLC/HDLC SERPL: 1.83 {RATIO}
LYMPHOCYTES # BLD AUTO: 2.6 10*3/MM3 (ref 0.7–3.1)
LYMPHOCYTES NFR BLD AUTO: 39.8 % (ref 19.6–45.3)
MCH RBC QN AUTO: 31.2 PG (ref 26.6–33)
MCHC RBC AUTO-ENTMCNC: 32.9 G/DL (ref 31.5–35.7)
MCV RBC AUTO: 94.8 FL (ref 79–97)
MONOCYTES # BLD AUTO: 0.42 10*3/MM3 (ref 0.1–0.9)
MONOCYTES NFR BLD AUTO: 6.4 % (ref 5–12)
NEUTROPHILS NFR BLD AUTO: 3.26 10*3/MM3 (ref 1.7–7)
NEUTROPHILS NFR BLD AUTO: 49.8 % (ref 42.7–76)
NRBC BLD AUTO-RTO: 0 /100 WBC (ref 0–0.2)
PLATELET # BLD AUTO: 188 10*3/MM3 (ref 140–450)
PMV BLD AUTO: 12.4 FL (ref 6–12)
POTASSIUM SERPL-SCNC: 4.3 MMOL/L (ref 3.5–5.2)
PROT SERPL-MCNC: 7.1 G/DL (ref 6–8.5)
RBC # BLD AUTO: 4.62 10*6/MM3 (ref 3.77–5.28)
SODIUM SERPL-SCNC: 140 MMOL/L (ref 136–145)
T3FREE SERPL-MCNC: 3.01 PG/ML (ref 2–4.4)
T4 FREE SERPL-MCNC: 1.12 NG/DL (ref 0.92–1.68)
TRIGL SERPL-MCNC: 56 MG/DL (ref 0–150)
TSH SERPL DL<=0.05 MIU/L-ACNC: 2.35 UIU/ML (ref 0.27–4.2)
VLDLC SERPL-MCNC: 11 MG/DL (ref 5–40)
WBC NRBC COR # BLD AUTO: 6.54 10*3/MM3 (ref 3.4–10.8)

## 2025-05-21 PROCEDURE — 80050 GENERAL HEALTH PANEL: CPT | Performed by: NURSE PRACTITIONER

## 2025-05-21 PROCEDURE — 84439 ASSAY OF FREE THYROXINE: CPT | Performed by: NURSE PRACTITIONER

## 2025-05-21 PROCEDURE — 84481 FREE ASSAY (FT-3): CPT | Performed by: NURSE PRACTITIONER

## 2025-05-21 PROCEDURE — 80061 LIPID PANEL: CPT | Performed by: NURSE PRACTITIONER

## 2025-05-21 RX ORDER — LEVOTHYROXINE SODIUM 25 UG/1
25 TABLET ORAL EVERY MORNING
Qty: 90 TABLET | Refills: 1 | Status: SHIPPED | OUTPATIENT
Start: 2025-05-21

## 2025-05-21 RX ORDER — LOSARTAN POTASSIUM 50 MG/1
50 TABLET ORAL DAILY
Qty: 90 TABLET | Refills: 1 | Status: SHIPPED | OUTPATIENT
Start: 2025-05-21

## 2025-05-21 NOTE — PROGRESS NOTES
Chief Complaint  Annual Exam    Subjective          Katy Dominguez presents to [facility]  History of Present Illness    History of Present Illness  The patient presents for a routine checkup.    She reports a recent weight loss, estimating her current weight to be approximately 175 to 176 pounds. She is not experiencing any chest pain. She maintains regular dental and ophthalmological check-ups and consistently uses a seatbelt while driving. She has been to [place] several times with her parents.    Her cholesterol levels were elevated during her last assessment. Blood pressure is currently well-controlled at 125/83. She is currently taking estrogen 1 mg, low-dose losartan, and low-dose levothyroxine.    She has a history of hypertension and hypothyroidism. A mammogram was performed in 12/2024 with Dr. Britt.      Patient or patient representative verbalized consent for the use of Ambient Listening during the visit with  ESTHER Carrero for chart documentation. 5/21/2025  09:38 EDT      Depression: Not at risk (5/21/2025)    PHQ-2     PHQ-2 Score: 0    and     BMI is >= 30 and <35. (Class 1 Obesity). The following options were offered after discussion;: exercise counseling/recommendations and nutrition counseling/recommendations         Allergies  Penicillins    Social History     Tobacco Use    Smoking status: Never     Passive exposure: Never    Smokeless tobacco: Never   Vaping Use    Vaping status: Never Used   Substance Use Topics    Alcohol use: Not Currently     Comment: None    Drug use: Never       Family History   Problem Relation Age of Onset    Heart disease Mother         Afib    Hypertension Mother         Low blood pressure    Rheum arthritis Mother     Arthritis Mother     Hearing loss Mother     Thyroid disease Mother     Rheumatologic disease Mother         Rheumatoid arthritis    Heart disease Father     Hypertension Father     Cancer Father      "    Mylodysplasia and multiple myeloma    Hearing loss Father     Anesthesia problems Father         Difficulty with intubation    Colon polyps Sister     Thyroid disease Sister         Hyperthyroidism    Colon polyps Brother     Colon polyps Brother     Colon cancer Paternal Aunt     Brain cancer Maternal Grandmother     Diabetes type II Maternal Grandmother     Heart disease Paternal Grandfather         Parkinsons    Hypertension Paternal Grandfather     Heart disease Other     Heart disease Other     Cancer Other         Unspecified    Diabetes type II Other     Colon cancer Nephew 36        had tumor and it burst    Malig Hyperthermia Neg Hx         Health Maintenance Due   Topic Date Due    COLORECTAL CANCER SCREENING  12/14/2024    ANNUAL PHYSICAL  05/21/2025        Immunization History   Administered Date(s) Administered    COVID-19 (MODERNA) 1st,2nd,3rd Dose Monovalent 03/19/2021, 04/16/2021    COVID-19 (MODERNA) Monovalent Original Booster 11/17/2021, 05/31/2022    COVID-19 (NOVAVAX) 12YRS+ 10/27/2024    COVID-19 (PFIZER) 12YRS+ (COMIRNATY) 10/21/2023    COVID-19 (PFIZER) BIVALENT 12+YRS 10/30/2022    Flublok 18+yrs 10/12/2020, 10/28/2021, 10/03/2022, 10/03/2023    Influenza, Unspecified 10/01/2020    Shingrix 05/31/2022, 08/28/2022    Tdap 12/02/2019       Review of Systems   Constitutional:  Negative for fatigue.   Respiratory:  Negative for cough and shortness of breath.    Cardiovascular:  Negative for chest pain.   Gastrointestinal:  Negative for diarrhea, nausea and vomiting.        Objective       Vitals:    05/21/25 0820   BP: 125/83   Pulse: 73   Resp: 18   Temp: 97.6 °F (36.4 °C)   SpO2: 97%   Weight: 77.9 kg (171 lb 12.8 oz)   Height: 157.5 cm (62\")       Body mass index is 31.42 kg/m².         Physical Exam  Vitals and nursing note reviewed.   Constitutional:       General: She is not in acute distress.     Appearance: Normal appearance. She is not ill-appearing.   HENT:      Right Ear: Tympanic " membrane and ear canal normal.      Left Ear: Tympanic membrane and ear canal normal.      Nose: Rhinorrhea present. No congestion.      Mouth/Throat:      Pharynx: No oropharyngeal exudate or posterior oropharyngeal erythema.   Eyes:      Conjunctiva/sclera: Conjunctivae normal.   Neck:      Vascular: No carotid bruit.   Cardiovascular:      Rate and Rhythm: Normal rate and regular rhythm.      Heart sounds: No murmur heard.  Pulmonary:      Effort: Pulmonary effort is normal.      Breath sounds: Normal breath sounds. No wheezing or rhonchi.   Abdominal:      General: Bowel sounds are normal.      Palpations: Abdomen is soft.   Musculoskeletal:      Cervical back: No tenderness.      Right lower leg: No edema.      Left lower leg: No edema.   Skin:     Findings: No bruising or rash.   Neurological:      General: No focal deficit present.      Mental Status: She is alert and oriented to person, place, and time. Mental status is at baseline.   Psychiatric:         Mood and Affect: Mood normal.         Behavior: Behavior normal.         Thought Content: Thought content normal.         Judgment: Judgment normal.           Physical Exam                Result Review :     The following data was reviewed by: ESTHER Carrero on 05/21/2025:    Common Labs         No Images in the past 120 days found..         Results  Labs   - Cholesterol: High              Component  Ref Range & Units 1 yr ago  (5/21/24) 2 yr ago  (5/18/23) 3 yr ago  (4/5/22) 4 yr ago  (1/19/21) 4 yr ago  (5/28/20) 6 yr ago  (2/20/19)   Total Cholesterol  0 - 200 mg/dL 188 166 153 175 R,  R,  R, CM   Triglycerides  0 - 150 mg/dL 60 42 52 88 R, CM 62 R, CM 72 R, CM   HDL Cholesterol  40 - 60 mg/dL 66 High  65 High  59 63 High  CM 55 CM 63 High  CM   LDL Cholesterol  0 - 100 mg/dL 111 High  92 83 94 R, CM 94 R, CM 94 R, CM   VLDL Cholesterol  5 - 40 mg/dL 11 9 11 18 R 12 R 14 R   LDL/HDL Ratio 1.67 1.42 1.42                         Assessment and Plan      Assessment & Plan  Annual physical exam  ADVICE WAS GIVEN RE:  ALCOHOL USE, SEATBELT USE, REGULAR EXERCISE, HEALTHY DIET, ROUTINE EYE AND DENTAL EXAMS    Orders:    Comprehensive Metabolic Panel    CBC & Differential    TSH    Lipid Panel    T4, Free    T3, Free    Acquired hypothyroidism    Orders:    levothyroxine (Synthroid) 25 MCG tablet; Take 1 tablet by mouth Every Morning.    Primary hypertension      Orders:    losartan (COZAAR) 50 MG tablet; Take 1 tablet by mouth Daily.       Diagnosis Plan   1. Annual physical exam  Comprehensive Metabolic Panel    CBC & Differential    TSH    Lipid Panel    T4, Free    T3, Free      2. Acquired hypothyroidism  levothyroxine (Synthroid) 25 MCG tablet      3. Primary hypertension  losartan (COZAAR) 50 MG tablet            Assessment & Plan  1. Health maintenance.  - Blood pressure readings are within the normal range at 125/83.  - Experienced a weight fluctuation of approximately 3 pounds over the past year.  - Last mammogram was conducted in 12/2024 under the supervision of Dr. Rodrigues.  - Advised to use peroxide drops in ears a few times a week to help with earwax buildup. Blood work will be ordered today to assess cholesterol and thyroid levels.    2. Hypercholesterolemia.  - Cholesterol levels have been progressively increasing over the past 3 years.  - If cholesterol levels have escalated into the 120s, a low dose statin of 5 mg will be initiated for protective measures.  - New research indicates that if not on cholesterol medicine, diabetic, or on multiple blood pressure medications, a cholesterol level between 100-115 is acceptable.  - Blood work will be done today to determine current cholesterol levels.    3. Hypertension.  - Blood pressure is well-controlled at 125/83.  - Prescription refill for low dose losartan will be provided.  - Monitoring of blood pressure will continue to ensure it remains within the normal range.    4.  Hypothyroidism.  - Prescription refill for low dose levothyroxine will be provided.  - Blood work will be ordered today to assess thyroid levels.  - Continued monitoring of thyroid function to ensure medication effectiveness.          Follow Up     Return in about 6 months (around 11/21/2025).    Patient was given instructions and counseling regarding her condition or for health maintenance advice. Please see specific information pulled into the AVS if appropriate.     Parts of this note are electronic transcriptions/translations of spoken language to printed text using the Dragon Dictation system.          Gisella Velazquez, APRN  05/21/2025

## 2025-05-23 ENCOUNTER — RESULTS FOLLOW-UP (OUTPATIENT)
Dept: FAMILY MEDICINE CLINIC | Facility: CLINIC | Age: 55
End: 2025-05-23
Payer: COMMERCIAL

## 2025-06-23 DIAGNOSIS — I10 ESSENTIAL HYPERTENSION: ICD-10-CM

## 2025-06-24 DIAGNOSIS — E03.9 ACQUIRED HYPOTHYROIDISM: ICD-10-CM

## 2025-06-24 RX ORDER — VALACYCLOVIR HYDROCHLORIDE 500 MG/1
500 TABLET, FILM COATED ORAL 2 TIMES DAILY
Qty: 60 TABLET | Refills: 5 | Status: SHIPPED | OUTPATIENT
Start: 2025-06-24

## 2025-06-24 RX ORDER — LEVOTHYROXINE SODIUM 25 MCG
25 TABLET ORAL EVERY MORNING
Qty: 60 TABLET | Refills: 2 | Status: SHIPPED | OUTPATIENT
Start: 2025-06-24

## 2025-08-15 ENCOUNTER — OFFICE VISIT (OUTPATIENT)
Dept: INTERNAL MEDICINE | Facility: CLINIC | Age: 55
End: 2025-08-15
Payer: COMMERCIAL

## 2025-08-15 VITALS
TEMPERATURE: 97.3 F | SYSTOLIC BLOOD PRESSURE: 118 MMHG | HEIGHT: 62 IN | HEART RATE: 65 BPM | DIASTOLIC BLOOD PRESSURE: 79 MMHG | BODY MASS INDEX: 31.28 KG/M2 | OXYGEN SATURATION: 98 % | WEIGHT: 170 LBS

## 2025-08-15 DIAGNOSIS — Z76.89 ENCOUNTER TO ESTABLISH CARE WITH NEW DOCTOR: ICD-10-CM

## 2025-08-15 DIAGNOSIS — N95.1 MENOPAUSAL SYMPTOMS: ICD-10-CM

## 2025-08-15 DIAGNOSIS — I10 PRIMARY HYPERTENSION: Primary | ICD-10-CM

## 2025-08-15 DIAGNOSIS — Z23 NEED FOR PNEUMOCOCCAL VACCINATION: ICD-10-CM

## 2025-08-15 DIAGNOSIS — Z86.0100 HISTORY OF COLONIC POLYPS: ICD-10-CM

## 2025-08-15 PROCEDURE — 99214 OFFICE O/P EST MOD 30 MIN: CPT | Performed by: INTERNAL MEDICINE

## 2025-08-19 ENCOUNTER — PATIENT ROUNDING (BHMG ONLY) (OUTPATIENT)
Dept: INTERNAL MEDICINE | Facility: CLINIC | Age: 55
End: 2025-08-19
Payer: COMMERCIAL

## 2025-08-27 ENCOUNTER — CLINICAL SUPPORT (OUTPATIENT)
Dept: INTERNAL MEDICINE | Facility: CLINIC | Age: 55
End: 2025-08-27
Payer: COMMERCIAL

## 2025-08-27 DIAGNOSIS — Z23 NEED FOR PNEUMOCOCCAL VACCINATION: Primary | ICD-10-CM

## (undated) DEVICE — COLON KIT: Brand: MEDLINE INDUSTRIES, INC.

## (undated) DEVICE — Device: Brand: DEFENDO AIR/WATER/SUCTION AND BIOPSY VALVE

## (undated) DEVICE — SOL IRRG H2O PL/BG 1000ML STRL